# Patient Record
Sex: FEMALE | Race: BLACK OR AFRICAN AMERICAN | NOT HISPANIC OR LATINO | Employment: FULL TIME | URBAN - METROPOLITAN AREA
[De-identification: names, ages, dates, MRNs, and addresses within clinical notes are randomized per-mention and may not be internally consistent; named-entity substitution may affect disease eponyms.]

---

## 2020-11-02 LAB
EXTERNAL HEPATITIS B SURFACE ANTIGEN: NEGATIVE
EXTERNAL HEPATITIS C AB: NEGATIVE
EXTERNAL RUBELLA QUALITATIVE: NORMAL
EXTERNAL SYPHILIS RPR SCREEN: NORMAL
HIV1 P24 AG SERPL QL IA: NORMAL

## 2021-01-20 ENCOUNTER — TRANSCRIBE ORDERS (OUTPATIENT)
Dept: OBSTETRICS AND GYNECOLOGY | Facility: CLINIC | Age: 31
End: 2021-01-20

## 2021-01-20 DIAGNOSIS — D21.9 FIBROIDS: ICD-10-CM

## 2021-01-20 DIAGNOSIS — O99.212 MATERNAL OBESITY, ANTEPARTUM, SECOND TRIMESTER: Primary | ICD-10-CM

## 2021-02-03 ENCOUNTER — HOSPITAL ENCOUNTER (OUTPATIENT)
Dept: ULTRASOUND IMAGING | Facility: HOSPITAL | Age: 31
Discharge: HOME | End: 2021-02-03
Admitting: OBSTETRICS & GYNECOLOGY

## 2021-02-03 ENCOUNTER — TRANSCRIBE ORDERS (OUTPATIENT)
Dept: ULTRASOUND IMAGING | Facility: HOSPITAL | Age: 31
End: 2021-02-03

## 2021-02-03 DIAGNOSIS — O99.212 MATERNAL OBESITY, ANTEPARTUM, SECOND TRIMESTER: Primary | ICD-10-CM

## 2021-02-03 DIAGNOSIS — D21.9 FIBROIDS: ICD-10-CM

## 2021-02-03 DIAGNOSIS — O99.212 MATERNAL OBESITY, ANTEPARTUM, SECOND TRIMESTER: ICD-10-CM

## 2021-02-03 PROCEDURE — 76811 OB US DETAILED SNGL FETUS: CPT

## 2021-02-18 ENCOUNTER — TRANSCRIBE ORDERS (OUTPATIENT)
Dept: ULTRASOUND IMAGING | Facility: HOSPITAL | Age: 31
End: 2021-02-18

## 2021-02-18 ENCOUNTER — HOSPITAL ENCOUNTER (OUTPATIENT)
Dept: ULTRASOUND IMAGING | Facility: HOSPITAL | Age: 31
Discharge: HOME | End: 2021-02-18
Admitting: OBSTETRICS & GYNECOLOGY

## 2021-02-18 VITALS
SYSTOLIC BLOOD PRESSURE: 130 MMHG | HEIGHT: 65 IN | WEIGHT: 293 LBS | TEMPERATURE: 97.1 F | HEART RATE: 95 BPM | BODY MASS INDEX: 48.82 KG/M2 | DIASTOLIC BLOOD PRESSURE: 72 MMHG

## 2021-02-18 DIAGNOSIS — O99.212 MATERNAL OBESITY, ANTEPARTUM, SECOND TRIMESTER: Primary | ICD-10-CM

## 2021-02-18 DIAGNOSIS — O99.212 MATERNAL OBESITY, ANTEPARTUM, SECOND TRIMESTER: ICD-10-CM

## 2021-02-18 PROCEDURE — 76816 OB US FOLLOW-UP PER FETUS: CPT

## 2021-03-18 ENCOUNTER — HOSPITAL ENCOUNTER (OUTPATIENT)
Dept: ULTRASOUND IMAGING | Facility: HOSPITAL | Age: 31
Discharge: HOME | End: 2021-03-18
Admitting: OBSTETRICS & GYNECOLOGY

## 2021-03-18 ENCOUNTER — OFFICE VISIT (OUTPATIENT)
Dept: OBSTETRICS AND GYNECOLOGY | Facility: CLINIC | Age: 31
End: 2021-03-18

## 2021-03-18 ENCOUNTER — TRANSCRIBE ORDERS (OUTPATIENT)
Dept: DIABETES SERVICES | Facility: HOSPITAL | Age: 31
End: 2021-03-18

## 2021-03-18 ENCOUNTER — TRANSCRIBE ORDERS (OUTPATIENT)
Dept: ULTRASOUND IMAGING | Facility: HOSPITAL | Age: 31
End: 2021-03-18

## 2021-03-18 VITALS
BODY MASS INDEX: 48.82 KG/M2 | DIASTOLIC BLOOD PRESSURE: 56 MMHG | HEART RATE: 95 BPM | HEIGHT: 65 IN | WEIGHT: 293 LBS | SYSTOLIC BLOOD PRESSURE: 115 MMHG | TEMPERATURE: 96.9 F

## 2021-03-18 DIAGNOSIS — O24.410 DIET CONTROLLED GESTATIONAL DIABETES MELLITUS (GDM) IN THIRD TRIMESTER: ICD-10-CM

## 2021-03-18 DIAGNOSIS — Z36.89 ENCOUNTER FOR ULTRASOUND TO ASSESS FETAL GROWTH: Primary | ICD-10-CM

## 2021-03-18 DIAGNOSIS — O99.212 MATERNAL OBESITY, ANTEPARTUM, SECOND TRIMESTER: ICD-10-CM

## 2021-03-18 DIAGNOSIS — O24.410 DIET CONTROLLED GESTATIONAL DIABETES MELLITUS (GDM) IN SECOND TRIMESTER: Primary | ICD-10-CM

## 2021-03-18 DIAGNOSIS — O99.212 MATERNAL OBESITY, ANTEPARTUM, SECOND TRIMESTER: Primary | ICD-10-CM

## 2021-03-18 DIAGNOSIS — E66.01 MORBID OBESITY WITH BMI OF 50.0-59.9, ADULT (HCC): ICD-10-CM

## 2021-03-18 PROCEDURE — 99203 OFFICE O/P NEW LOW 30 MIN: CPT | Performed by: OBSTETRICS & GYNECOLOGY

## 2021-03-18 PROCEDURE — 76816 OB US FOLLOW-UP PER FETUS: CPT

## 2021-03-18 PROCEDURE — 76816 OB US FOLLOW-UP PER FETUS: CPT | Performed by: OBSTETRICS & GYNECOLOGY

## 2021-03-18 NOTE — PROGRESS NOTES
MATERNAL FETAL MEDICINE OUTPATIENT NOTE     Dear Dr BERONICA Snow     This is a followup visit.     Thank you for requesting my service to provide consultation for Radha Shipley is a 30 y.o.  at  28 3/7 weeks gestation (Estimated Date of Delivery: 21).   She is being seen for: fetal growth assessment and followup anatomy  Suboptimal anatomy given morbid obesity.     Patient states had GCT earlier this week and was > 200  Results not available at today's consultation  She says she is scheduled for diabetes teaching with her OB.     Prior notes from the patient's obstetrician were reviewed.     Today, she denies headache, blurry vision, RUQ pain. No vaginal bleeding, no contractions.     Chief complaint    ICD-10-CM ICD-9-CM   1. Encounter for ultrasound to assess fetal growth  Z36.89 V28.3   2. Morbid obesity with BMI of 50.0-59.9, adult (CMS/[facility])  E66.01 278.01    Z68.43 V85.43   3. New diagnosis per patient report GCT > 200 gestational diabetes mellitus (GDM) in third trimester  O24.410 648.83       Past obstetric, gynecological, medical, surgical, family and social history reviewed; no changes made.     Review of systems  Constitutional:  No Weight Change, No Fever, No Chills, No Fatigue, No Malaise  ENT/Mouth:  No Hearing Changes, No Sinus Pain, No Hoarseness, No sore throat, No   Eyes:  No Eye Pain, No Vision Changes  Cardiovascular:  No Chest Pain, No SOB, No Palpitations  Respiratory:  No Cough, No Wheezing, No Dyspnea  Gastrointestinal:  No Nausea, No Vomiting, No Diarrhea, No Constipation, No Pain   Genitourinary:   No Dysuria, No Urinary Frequency, No Hematuria, No Flank Pain  Musculoskeletal:  No Arthralgias, No Myalgias,   Skin:  No Skin Lesions, No Pruritis,   Neuro:  No Weakness, No Numbness, No Loss of Consciousness, No Syncope  Psych:  No Memory Changes, No Violence/Abuse Hx., No Eating Concerns  Heme/Lymph:  No Bruising, No Bleeding/  Endocrine:   No Temperature Intolerance    Vitals:     "21 0809   BP: 115/56   BP Location: Right arm   Patient Position: Sitting   Cuff Size: Large Adult   Pulse: 95   Temp: 96.9 °F (36.1 °C)   TempSrc: Infrared   Weight: (!) 151 kg (333 lb)   Height: 165.1 cm (65\")       PHYSICAL EXAM   GENERAL: Not in acute distress, gravid   NEURO: awake, alert and oriented to person, place, and time  ABDOMINAL: No fundal tenderness, no rebound or guarding   EXTREMITIES: Bilaterally lower extremities No edema, no tenderness  PELVIC: No obvious vaginal discharge, no bleeding    ULTRASOUND   Please view full ultrasound note on Imaging tab in ViewPoint.      ASSESSMENT   30 y.o.  at  28 3/7 weeks gestation (Estimated Date of Delivery: 21), reassuring fetal and maternal status.     1. Single live intrauterine pregnancy   [ X ] stable  [   ] improving [  ] worsening    2. Gestational Diabetes   New diagnosis   Per patient GCT > 200     Counseled patient that GCT > 200, she is likely pre-gestational diabetic but undiagnosed. States she did not have diabetes screening prior to pregnancy.     -Extensive diabetes counseling today -  morbidity associated with diabetes in pregnancy reviewed, including but not limited to: spontaneous , errors in organogenesis including increased risk of heart and skeletal defects, fetal macrosomia,  hypoglycemia, shoulder dystocia, increased risk of preeclampsia.   -Reviewed goals of fasting 60 - 95 and 2 hour postprandial< 120 throughout pregnancy.  -Counseled regarding risk of hypoglycemia (maternal and fetal risks)   -Recommend emailing/fax/submitting her log weekly for review to her primary OB office; to bring log to all MFM office visits.  -Education material given [email or web address]      3. Obesity in pregnancy  [ X ] stable  [   ] improving [  ] worsening    Obesity increases the risk of hypertensive disorders, diabetes and operative delivery. We discussed " recommended weight gain of 11-20 lb during pregnancy and discussed the importance of diet modifications and exercise. Technical limitations (eg, maternal obesity, prone fetal position, and late gestation) can make a detailed heart evaluation very difficult due to acoustic shadowing. Therefore, fetal anatomy is suboptimal and will likely remain suboptimal for the remainder of the pregnancy    Per ACOG: Women in pregnancy should aim for 30 minutes of moderate-intensity aerobic exercise at least 5 days a week or a minimum of 150 minutes per week      4. Fibroid in pregnancy   [ X ] stable  [   ] improving [  ] worsening  We discussed the fibroids may increase the risks for spontaneous , abruption, malpresentation, intrauterine growth restriction, and post-partum hemorrhage. There is a risk for fibroid degeneration and pain during pregnancy, which is generally treated supportively (e.g. with a supportive abdominal binder) or with a short course of pain medication.OB to continue to monitor the size and position of the fibroids with the growth ultrasounds.  [facility] information packet given      MANAGEMENT PLAN  -Scheduled for diabetes counseling and education with primary OB  -Once she starts monitoring her glucose values: Recommend contacting her primary OB office if >50% of any values are abnormal or any are over 200.   -Serial growth ultrasounds every 3 - 4 weeks   -Starting at 34 weeks: Weekly fetal  surveillance until delivery (start sooner if she becomes GDMA2)   -Starting at 28 weeks: Fetal movement instructions given continue daily until delivery; instructed to report to labor and delivery if cannot achieve more than 10 kicks in one hour or if she perceives a decrease in fetal movement  -If estimated fetal weight >4500gm at time of delivery, would recommend  delivery counseling       Plan discussed with Dr BERONICA Snow.   This note has been routed to the referring obstetricians/medical provider.    At the end of the consultation, all patient questions were answered, concerns addressed, and comprehensive management plan and followup given to patient.     Thank you for your clinical consult.     DANK GARCIA MD MPH FACOG  MATERNAL FETAL MEDICINE

## 2021-03-19 ENCOUNTER — APPOINTMENT (OUTPATIENT)
Dept: DIABETES SERVICES | Facility: HOSPITAL | Age: 31
End: 2021-03-19

## 2021-03-22 ENCOUNTER — HOSPITAL ENCOUNTER (OUTPATIENT)
Dept: DIABETES SERVICES | Facility: HOSPITAL | Age: 31
Discharge: HOME | End: 2021-03-22
Admitting: NURSE PRACTITIONER

## 2021-03-22 PROCEDURE — G0108 DIAB MANAGE TRN  PER INDIV: HCPCS

## 2021-04-15 ENCOUNTER — OFFICE VISIT (OUTPATIENT)
Dept: OBSTETRICS AND GYNECOLOGY | Facility: CLINIC | Age: 31
End: 2021-04-15

## 2021-04-15 ENCOUNTER — TRANSCRIBE ORDERS (OUTPATIENT)
Dept: ULTRASOUND IMAGING | Facility: HOSPITAL | Age: 31
End: 2021-04-15

## 2021-04-15 ENCOUNTER — HOSPITAL ENCOUNTER (OUTPATIENT)
Dept: ULTRASOUND IMAGING | Facility: HOSPITAL | Age: 31
Discharge: HOME | End: 2021-04-15
Admitting: OBSTETRICS & GYNECOLOGY

## 2021-04-15 VITALS
DIASTOLIC BLOOD PRESSURE: 62 MMHG | HEART RATE: 90 BPM | HEIGHT: 65 IN | BODY MASS INDEX: 48.82 KG/M2 | WEIGHT: 293 LBS | SYSTOLIC BLOOD PRESSURE: 119 MMHG | TEMPERATURE: 97.1 F

## 2021-04-15 DIAGNOSIS — O99.212 MATERNAL OBESITY, ANTEPARTUM, SECOND TRIMESTER: Primary | ICD-10-CM

## 2021-04-15 DIAGNOSIS — Z91.89 AT RISK FOR FETAL COMPROMISE: ICD-10-CM

## 2021-04-15 DIAGNOSIS — O99.212 MATERNAL OBESITY, ANTEPARTUM, SECOND TRIMESTER: ICD-10-CM

## 2021-04-15 DIAGNOSIS — E66.01 MORBID OBESITY WITH BMI OF 50.0-59.9, ADULT (HCC): ICD-10-CM

## 2021-04-15 DIAGNOSIS — O24.410 DIET CONTROLLED GESTATIONAL DIABETES MELLITUS (GDM) IN THIRD TRIMESTER: Primary | ICD-10-CM

## 2021-04-15 DIAGNOSIS — Z92.89 HISTORY OF FETAL BIOPHYSICAL PROFILE: ICD-10-CM

## 2021-04-15 DIAGNOSIS — Z91.89 AT RISK FOR HYPERTENSION: ICD-10-CM

## 2021-04-15 PROCEDURE — 76816 OB US FOLLOW-UP PER FETUS: CPT

## 2021-04-15 PROCEDURE — 99214 OFFICE O/P EST MOD 30 MIN: CPT | Performed by: OBSTETRICS & GYNECOLOGY

## 2021-04-15 PROCEDURE — 76816 OB US FOLLOW-UP PER FETUS: CPT | Performed by: OBSTETRICS & GYNECOLOGY

## 2021-04-15 PROCEDURE — 76819 FETAL BIOPHYS PROFIL W/O NST: CPT | Performed by: OBSTETRICS & GYNECOLOGY

## 2021-04-15 PROCEDURE — 76819 FETAL BIOPHYS PROFIL W/O NST: CPT

## 2021-04-15 RX ORDER — BLOOD-GLUCOSE METER
EACH MISCELLANEOUS
COMMUNITY
Start: 2021-03-19

## 2021-04-15 RX ORDER — BLOOD SUGAR DIAGNOSTIC
STRIP MISCELLANEOUS
COMMUNITY
Start: 2021-04-10

## 2021-04-15 RX ORDER — INSULIN DETEMIR 100 [IU]/ML
INJECTION, SOLUTION SUBCUTANEOUS
COMMUNITY
Start: 2021-03-30 | End: 2021-04-29 | Stop reason: SDUPTHER

## 2021-04-15 RX ORDER — PEN NEEDLE, DIABETIC 32 GX 1/4"
NEEDLE, DISPOSABLE MISCELLANEOUS
COMMUNITY
Start: 2021-03-30

## 2021-04-15 RX ORDER — LANCETS 30 GAUGE
EACH MISCELLANEOUS
COMMUNITY
Start: 2021-04-13

## 2021-04-15 NOTE — PROGRESS NOTES
MATERNAL FETAL MEDICINE OUTPATIENT NOTE     Dear Dr BERONICA nSow     This is a followup visit.     Thank you for requesting my service to provide consultation for Radha Shipley is a 30 y.o.   at  32 3/7 weeks gestation (Estimated Date of Delivery: 21).   She is being seen for:diabetes in pregnancy management, medication management, and fetal assessment.     Prior notes from the patient's obstetrician were reviewed.     Today, she denies headache, blurry vision, RUQ pain. No vaginal bleeding, no contractions.     Chief complaint    ICD-10-CM ICD-9-CM   1. New diagnosis per patient report GCT > 200 gestational diabetes mellitus (GDM) in third trimester  O24.410 648.83   2. Morbid obesity with BMI of 50.0-59.9, adult (CMS/[facility])  E66.01 278.01    Z68.43 V85.43   3. At risk for hypertension  Z91.89 V15.89   4. History of fetal biophysical profile  Z92.89 V15.89   5. At risk for fetal compromise  Z91.89 V15.89       Past obstetric, gynecological, medical, surgical, family and social history reviewed; no changes made.     Review of systems  Constitutional:  No Weight Change, No Fever, No Chills, No Fatigue, No Malaise  ENT/Mouth:  No Hearing Changes, No Sinus Pain, No Hoarseness, No sore throat, No   Eyes:  No Eye Pain, No Vision Changes  Cardiovascular:  No Chest Pain, No SOB, No Palpitations  Respiratory:  No Cough, No Wheezing, No Dyspnea  Gastrointestinal:  No Nausea, No Vomiting, No Diarrhea, No Constipation, No Pain   Genitourinary:   No Dysuria, No Urinary Frequency, No Hematuria, No Flank Pain  Musculoskeletal:  No Arthralgias, No Myalgias,   Skin:  No Skin Lesions, No Pruritis,   Neuro:  No Weakness, No Numbness, No Loss of Consciousness, No Syncope  Psych:  No Memory Changes, No Violence/Abuse Hx., No Eating Concerns  Heme/Lymph:  No Bruising, No Bleeding  Endocrine:   No Temperature Intolerance    Vitals:    04/15/21 0835   BP: 119/62   BP Location: Right arm   Patient Position: Sitting   Cuff Size: Large  "Adult   Pulse: 90   Temp: 97.1 °F (36.2 °C)   TempSrc: Infrared   Weight: (!) 152 kg (334 lb)   Height: 165.1 cm (65\")       PHYSICAL EXAM   GENERAL: Not in acute distress, gravid   NEURO: awake, alert and oriented to person, place, and time  ABDOMINAL: No fundal tenderness, no rebound or guarding   EXTREMITIES: Bilaterally lower extremities No edema, no tenderness  PELVIC: No obvious vaginal discharge, no bleeding    ULTRASOUND   Please view full ultrasound note on Imaging tab in ViewPoint.      ASSESSMENT   30 y.o.   at  32 3/7 weeks gestation (Estimated Date of Delivery: 21), reassuring fetal and maternal status.     1. Single live intrauterine pregnancy   [ X ] stable  [   ] improving [  ] worsening    2. Gestational Diabetes - likely Pregestational diabetes   No log today - unclear control     -Extensive diabetes counseling today -  morbidity associated with diabetes in pregnancy reviewed, including but not limited to: spontaneous , errors in organogenesis including increased risk of heart and skeletal defects, fetal macrosomia,  hypoglycemia, shoulder dystocia, increased risk of preeclampsia.   -Reviewed goals of fasting 60 - 95 and 2 hour postprandial< 120 throughout pregnancy.  -Counseled regarding risk of hypoglycemia (maternal and fetal risks)   -Recommend emailing/fax/submitting her log weekly for review to her primary OB office; to bring log to all MFM office visits.     3. Obesity in pregnancy - see prior consultation   [ X ] stable  [   ] improving [  ] worsening     4. Fibroid in pregnancy - see prior consultation   [ X ] stable  [   ] improving [  ] worsening       MANAGEMENT PLAN  -Reports will email glucose log to MFM office for formal review.   -Once she starts monitoring her glucose values: Recommend contacting her primary OB office if >50% of any values are abnormal or any are over 200.   -Serial growth ultrasounds every 3 - 4 weeks   -Weekly fetal  " surveillance until delivery; starting now given unclear control   -Starting at 28 weeks: Fetal movement instructions given continue daily until delivery; instructed to report to labor and delivery if cannot achieve more than 10 kicks in one hour or if she perceives a decrease in fetal movement  -If estimated fetal weight >4500gm at time of delivery, would recommend  delivery counseling            This note has been routed to the referring obstetricians/medical provider.   At the end of the consultation, all patient questions were answered, concerns addressed, and comprehensive management plan and followup given to patient.     Thank you for your clinical consult.     DANK GARCIA MD MPH FACOG  MATERNAL FETAL MEDICINE

## 2021-04-16 ENCOUNTER — BULK ORDERING (OUTPATIENT)
Dept: CASE MANAGEMENT | Facility: OTHER | Age: 31
End: 2021-04-16

## 2021-04-16 ENCOUNTER — TELEPHONE (OUTPATIENT)
Dept: OBSTETRICS AND GYNECOLOGY | Facility: CLINIC | Age: 31
End: 2021-04-16

## 2021-04-16 DIAGNOSIS — Z23 IMMUNIZATION DUE: ICD-10-CM

## 2021-04-16 NOTE — TELEPHONE ENCOUNTER
Called patient and reviewed glucose log values. On Levemir 40units nightly increased from 30 units by diabetes nurse.  No meal time insulin needed - for now     Glucose log  Fasting > 50% ABnormal   Postprandial values   Breakfast  > 50% normal  Lunch > 50% normal  Dinner > 50% normal    Dietary indiscretions reviewed.   Proper eating habits reviewed.     Followup with MFM in one week.     DANK GARCIA MD MPH FACOG  MATERNAL FETAL MEDICINE

## 2021-04-22 ENCOUNTER — OFFICE VISIT (OUTPATIENT)
Dept: OBSTETRICS AND GYNECOLOGY | Facility: CLINIC | Age: 31
End: 2021-04-22

## 2021-04-22 ENCOUNTER — TRANSCRIBE ORDERS (OUTPATIENT)
Dept: ULTRASOUND IMAGING | Facility: HOSPITAL | Age: 31
End: 2021-04-22

## 2021-04-22 ENCOUNTER — HOSPITAL ENCOUNTER (OUTPATIENT)
Dept: ULTRASOUND IMAGING | Facility: HOSPITAL | Age: 31
Discharge: HOME | End: 2021-04-22
Admitting: OBSTETRICS & GYNECOLOGY

## 2021-04-22 VITALS
TEMPERATURE: 96.8 F | HEIGHT: 65 IN | SYSTOLIC BLOOD PRESSURE: 121 MMHG | WEIGHT: 293 LBS | DIASTOLIC BLOOD PRESSURE: 66 MMHG | BODY MASS INDEX: 48.82 KG/M2 | HEART RATE: 98 BPM

## 2021-04-22 DIAGNOSIS — Z92.89 HISTORY OF FETAL BIOPHYSICAL PROFILE: ICD-10-CM

## 2021-04-22 DIAGNOSIS — O99.212 MATERNAL OBESITY, ANTEPARTUM, SECOND TRIMESTER: Primary | ICD-10-CM

## 2021-04-22 DIAGNOSIS — O99.212 MATERNAL OBESITY, ANTEPARTUM, SECOND TRIMESTER: ICD-10-CM

## 2021-04-22 DIAGNOSIS — O24.913 DIABETES MELLITUS DURING PREGNANCY IN THIRD TRIMESTER, UNSPECIFIED DIABETES MELLITUS TYPE: ICD-10-CM

## 2021-04-22 DIAGNOSIS — E66.01 MORBID OBESITY WITH BMI OF 50.0-59.9, ADULT (HCC): ICD-10-CM

## 2021-04-22 DIAGNOSIS — Z91.89 AT RISK FOR FETAL COMPROMISE: ICD-10-CM

## 2021-04-22 DIAGNOSIS — O24.410 DIET CONTROLLED GESTATIONAL DIABETES MELLITUS (GDM) IN THIRD TRIMESTER: Primary | ICD-10-CM

## 2021-04-22 PROCEDURE — 76819 FETAL BIOPHYS PROFIL W/O NST: CPT | Performed by: OBSTETRICS & GYNECOLOGY

## 2021-04-22 PROCEDURE — 76819 FETAL BIOPHYS PROFIL W/O NST: CPT

## 2021-04-22 PROCEDURE — 99214 OFFICE O/P EST MOD 30 MIN: CPT | Performed by: OBSTETRICS & GYNECOLOGY

## 2021-04-22 NOTE — PROGRESS NOTES
MATERNAL FETAL MEDICINE OUTPATIENT NOTE     Dear Dr BERONICA Snow    This is a  followup visit.     Thank you for requesting my service to provide consultation for Radha Shipley is a 30 y.o.   at  33 3/7 weeks gestation (Estimated Date of Delivery: 21).   She is being seen for:diabetes in pregnancy management, medication management, and fetal assessment.   Maintains daily fetal movement counts reports > 10 movements per hour.     Prior notes from the patient's obstetrician were reviewed.     Today, she denies headache, blurry vision, RUQ pain. No vaginal bleeding, no contractions.     Chief complaint    ICD-10-CM ICD-9-CM   1. New diagnosis per patient report GCT > 200 gestational diabetes mellitus (GDM) in third trimester  O24.410 648.83   2. Morbid obesity with BMI of 50.0-59.9, adult (CMS/[facility])  E66.01 278.01    Z68.43 V85.43   3. History of fetal biophysical profile  Z92.89 V15.89   4. At risk for fetal compromise  Z91.89 V15.89       Past obstetric, gynecological, medical, surgical, family and social history reviewed; no changes made.     Review of systems  Constitutional:  No Weight Change, No Fever, No Chills, No Fatigue, No Malaise  ENT/Mouth:  No Hearing Changes, No Sinus Pain, No Hoarseness, No sore throat, No   Eyes:  No Eye Pain, No Vision Changes  Cardiovascular:  No Chest Pain, No SOB, No Palpitations  Respiratory:  No Cough, No Wheezing, No Dyspnea  Gastrointestinal:  No Nausea, No Vomiting, No Diarrhea, No Constipation, No Pain   Genitourinary:   No Dysuria, No Urinary Frequency, No Hematuria, No Flank Pain  Musculoskeletal:  No Arthralgias, No Myalgias,   Skin:  No Skin Lesions, No Pruritis,   Neuro:  No Weakness, No Numbness, No Loss of Consciousness, No Syncope  Psych:  No Memory Changes, No Violence/Abuse Hx., No Eating Concerns  Heme/Lymph:  No Bruising, No Bleeding  Endocrine:   No Temperature Intolerance    Vitals:    21 0909   BP: 121/66   BP Location: Right arm   Patient Position:  "Sitting   Cuff Size: Large Adult   Pulse: 98   Temp: 96.8 °F (36 °C)   TempSrc: Infrared   Weight: (!) 153 kg (337 lb)   Height: 165.1 cm (65\")       PHYSICAL EXAM   GENERAL: Not in acute distress, gravid   NEURO: awake, alert and oriented to person, place, and time  ABDOMINAL: No fundal tenderness, no rebound or guarding   EXTREMITIES: Bilaterally lower extremities No edema, no tenderness  PELVIC: No obvious vaginal discharge, no bleeding    ULTRASOUND   Please view full ultrasound note on Imaging tab in ViewPoint.      ASSESSMENT   30 y.o.   at  33 3/7 weeks gestation (Estimated Date of Delivery: 21), reassuring fetal and maternal status.     1. Single live intrauterine pregnancy   [ X ] stable  [   ] improving [  ] worsening    2. Gestational Diabetes - likely Pregestational diabetes   [ X ] stable  [   ] improving [  ] worsening  [  ]  well-controlled  [  ] good-control  [ X ] fair-control [  ] poorly-controlled  [  ] non-compliant    INSULIN MEDICATION   NPH 40units  increased to 45 units - then had a 131 fasting     Glucose log  Fasting > 50% ABNORMAL, one normal value = 95   Postprandial values   Breakfast  > 50% normal  Lunch > 50% normal  Dinner > 50% normal     - Counseled regarding - nighttime hypoglycemia with rebound fasting hyperglycemia\" that can occur when increasing PM dosing of insulin    -Reviewed goals of fasting 60 - 95 and 2 hour postprandial< 120 throughout pregnancy.  -Counseled regarding risk of hypoglycemia (maternal and fetal risks)   -Recommend emailing/fax/submitting her log weekly for review to her primary OB office; to bring log to all MFM office visits.     3. Obesity in pregnancy - see prior consultation   [ X ] stable  [   ] improving [  ] worsening     4. Fibroid in pregnancy - see prior consultation   [ X ] stable  [   ] improving [  ] worsening        MANAGEMENT PLAN  -Continue nighttime NPH at 45 units   -Recommend patient to check 2AM glucose values for 2 - 3 nights to " "help assess for \"nighttime hypoglycemia with rebound fasting hyperglycemia\" that can occur when increasing PM dosing of insulin  - If fasting values persistently > 95 then recommend reducing by 2 - 4 units     -Recommend contacting her primary OB office if >50% of any values are abnormal or any are over 200.   -Serial growth ultrasounds every 3 - 4 weeks   -Weekly fetal  surveillance until delivery; starting now given unclear control   -Movement log given -  Fetal movement instructions given continue daily until delivery; instructed to report to labor and delivery if cannot achieve more than 10 kicks in one hour or if she perceives a decrease in fetal movement  -If estimated fetal weight >4500gm at time of delivery, would recommend  delivery counseling                 This note has been routed to the referring obstetricians/medical provider.   At the end of the consultation, all patient questions were answered, concerns addressed, and comprehensive management plan and followup given to patient.     Thank you for your clinical consult.     DANK GARCIA MD MPH FACOG  MATERNAL FETAL MEDICINE       "

## 2021-04-29 ENCOUNTER — HOSPITAL ENCOUNTER (OUTPATIENT)
Dept: ULTRASOUND IMAGING | Facility: HOSPITAL | Age: 31
Discharge: HOME | End: 2021-04-29
Admitting: OBSTETRICS & GYNECOLOGY

## 2021-04-29 ENCOUNTER — OFFICE VISIT (OUTPATIENT)
Dept: OBSTETRICS AND GYNECOLOGY | Facility: CLINIC | Age: 31
End: 2021-04-29

## 2021-04-29 VITALS
WEIGHT: 293 LBS | HEIGHT: 65 IN | TEMPERATURE: 97.3 F | BODY MASS INDEX: 48.82 KG/M2 | HEART RATE: 97 BPM | DIASTOLIC BLOOD PRESSURE: 79 MMHG | SYSTOLIC BLOOD PRESSURE: 129 MMHG

## 2021-04-29 DIAGNOSIS — O99.212 MATERNAL OBESITY, ANTEPARTUM, SECOND TRIMESTER: ICD-10-CM

## 2021-04-29 DIAGNOSIS — E66.01 MORBID OBESITY WITH BMI OF 50.0-59.9, ADULT (HCC): ICD-10-CM

## 2021-04-29 DIAGNOSIS — O34.10 UTERINE FIBROID IN PREGNANCY: ICD-10-CM

## 2021-04-29 DIAGNOSIS — D25.9 UTERINE FIBROID IN PREGNANCY: ICD-10-CM

## 2021-04-29 DIAGNOSIS — O24.913 DIABETES MELLITUS AFFECTING PREGNANCY IN THIRD TRIMESTER: Primary | ICD-10-CM

## 2021-04-29 DIAGNOSIS — Z91.89 AT RISK FOR FETAL COMPROMISE: ICD-10-CM

## 2021-04-29 DIAGNOSIS — Z92.89 HISTORY OF FETAL BIOPHYSICAL PROFILE: ICD-10-CM

## 2021-04-29 DIAGNOSIS — R73.09 POOR GLYCEMIC CONTROL: ICD-10-CM

## 2021-04-29 PROCEDURE — 76819 FETAL BIOPHYS PROFIL W/O NST: CPT | Performed by: OBSTETRICS & GYNECOLOGY

## 2021-04-29 PROCEDURE — 99214 OFFICE O/P EST MOD 30 MIN: CPT | Performed by: OBSTETRICS & GYNECOLOGY

## 2021-04-29 PROCEDURE — 76819 FETAL BIOPHYS PROFIL W/O NST: CPT

## 2021-04-29 RX ORDER — INSULIN DETEMIR 100 [IU]/ML
50 INJECTION, SOLUTION SUBCUTANEOUS NIGHTLY
Qty: 5 PEN | Refills: 0 | Status: SHIPPED | OUTPATIENT
Start: 2021-04-29 | End: 2021-06-01 | Stop reason: HOSPADM

## 2021-04-29 NOTE — PROGRESS NOTES
MATERNAL FETAL MEDICINE OUTPATIENT NOTE     Dear Dr BERONICA Snow     This is a followup visit.     Thank you for requesting my service to provide consultation for Radha Shipley is a 30 y.o.   at  34 3/7 weeks gestation (Estimated Date of Delivery: 21).   She is being seen for:diabetes in pregnancy management, medication management, and fetal assessment.   Maintains daily fetal movement counts reports > 10 movements per hour.     Prior notes from the patient's obstetrician were reviewed.     Today, she denies headache, blurry vision, RUQ pain. No vaginal bleeding, no contractions.     Chief complaint    ICD-10-CM ICD-9-CM   1. Diabetes mellitus affecting pregnancy in third trimester  O24.913 648.03     250.00   2. Poor glycemic control  R73.09 790.29   3. History of fetal biophysical profile  Z92.89 V15.89   4. At risk for fetal compromise  Z91.89 V15.89   5. Morbid obesity with BMI of 50.0-59.9, adult (CMS/[facility])  E66.01 278.01    Z68.43 V85.43   6. Uterine fibroid in pregnancy  O34.10 654.10    D25.9 218.9       Past obstetric, gynecological, medical, surgical, family and social history reviewed; no changes made.     Review of systems  Constitutional:  No Weight Change, No Fever, No Chills, No Fatigue, No Malaise  ENT/Mouth:  No Hearing Changes, No Sinus Pain, No Hoarseness, No sore throat, No   Eyes:  No Eye Pain, No Vision Changes  Cardiovascular:  No Chest Pain, No SOB, No Palpitations  Respiratory:  No Cough, No Wheezing, No Dyspnea  Gastrointestinal:  No Nausea, No Vomiting, No Diarrhea, No Constipation, No Pain   Genitourinary:   No Dysuria, No Urinary Frequency, No Hematuria, No Flank Pain  Musculoskeletal:  No Arthralgias, No Myalgias,   Skin:  No Skin Lesions, No Pruritis,   Neuro:  No Weakness, No Numbness, No Loss of Consciousness, No Syncope  Psych:  No Memory Changes, No Violence/Abuse Hx., No Eating Concerns  Heme/Lymph:  No Bruising, No Bleeding  Endocrine:   No Temperature  "Intolerance    Vitals:    21 0947   BP: 129/79   BP Location: Right arm   Patient Position: Sitting   Cuff Size: Large Adult   Pulse: 97   Temp: 97.3 °F (36.3 °C)   TempSrc: Infrared   Weight: (!) 151 kg (333 lb)   Height: 165.1 cm (65\")       PHYSICAL EXAM   GENERAL: Not in acute distress, gravid   NEURO: awake, alert and oriented to person, place, and time  ABDOMINAL: No fundal tenderness, no rebound or guarding   EXTREMITIES: Bilaterally lower extremities No edema, no tenderness  PELVIC: No obvious vaginal discharge, no bleeding    ULTRASOUND   Please view full ultrasound note on Imaging tab in ViewPoint.      ASSESSMENT   30 y.o.   at  34 3/7 weeks gestation (Estimated Date of Delivery: 21), reassuring fetal and maternal status.     1. Single live intrauterine pregnancy   [ X ] stable  [   ] improving [  ] worsening    2. Gestational Diabetes - likely Pregestational diabetes   [   ] stable  [   ] improving [ X ] worsening  [  ]  well-controlled  [  ] good-control  [ X ] fair-control [  ] poorly-controlled  [  ] non-compliant     INSULIN MEDICATION   NPH 45 units in PM      Glucose log  Fasting > 50% ABNORMAL, 2/3am glucose values normal - no evidence of hypoglycemia   Postprandial values   Breakfast  > 50% normal  Lunch > 50% normal  Dinner > 50% normal     - Counseled regarding - nighttime hypoglycemia with rebound fasting hyperglycemia\" is likely NOT occurring, instead persistent insulin resistance of placenta is likely at play      -Reviewed goals of fasting 60 - 95 and 2 hour postprandial< 120 throughout pregnancy.  -Counseled regarding risk of hypoglycemia (maternal and fetal risks)   -Recommend emailing/fax/submitting her log weekly for review to her primary OB office; to bring log to all MFM office visits.     3. Obesity in pregnancy - see prior consultation   [ X ] stable  [   ] improving [  ] worsening     4. Fibroid in pregnancy - see prior consultation   [ X ] stable  [   ] improving [ " " ] worsening         REVISED INSULIN MEDICATION  (changes in bold)   NPH 50 units in PM        MANAGEMENT PLAN  Patient is extremely compliant. However placental influence on insulin resistance persists even at this late stage in the 3rd trimester approaching term.     -Recommend contacting her primary OB office if >50% of any values are abnormal or any are over 200.   -Serial growth ultrasounds - next scheduled at approx 35 weeks gestation   -Weekly fetal  surveillance until delivery  -Movement log reviewed -  Fetal movement instructions given continue daily until delivery; instructed to report to labor and delivery if cannot achieve more than 10 kicks in one hour or if she perceives a decrease in fetal movement  -If estimated fetal weight >4500gm at time of delivery, would recommend  delivery counseling     DELIVERY MANAGEMENT   Per ACOG, pregestational diabetes poor glucose control - 36 0/7 to 38 6/7 weeks gestation      - Counseled patient that poorly controlled maternal diabetes in pregnancy may delay fetal pulmonary maturation. However the risk of IUFD should be weighed with potential delay in fetal pulmonary maturity (and need for NICU) when making decision regarding delivery timing. Considering  the large amounts of insulin required, increases in dosages, and increased risk of IUFD, would recommend delivery 37 - 38 weeks; sooner if fetal assessment is unfavorable or for any other clinical indication. Patient is aware that NICU care may be needed with the delivery of her term infant.       betamethasone between 34 - 36 6/7 weeks gestation  A 2016, multicenter, randomized trial demonstrated that \"administration of  betamethasone in women at risk for late  delivery significantly decreased the rate of respiratory complications in newborns. Betamethasone administration significantly increased the rate of  hypoglycemia but not the rates of other maternal or " " complications.\" It should be noted that pre-gestational diabetics were excluded from this study. (n engl j med 374;14.2016) - would recommend against giving steroids for fetal lung maturity acceleration in preparation for term induction           This note has been routed to the referring obstetricians/medical provider.   At the end of the consultation, all patient questions were answered, concerns addressed, and comprehensive management plan and followup given to patient.     Thank you for your clinical consult.     DANK GARCIA MD MPH FACOG  MATERNAL FETAL MEDICINE       "

## 2021-05-06 ENCOUNTER — HOSPITAL ENCOUNTER (OUTPATIENT)
Dept: ULTRASOUND IMAGING | Facility: HOSPITAL | Age: 31
Discharge: HOME | End: 2021-05-06
Admitting: OBSTETRICS & GYNECOLOGY

## 2021-05-06 ENCOUNTER — OFFICE VISIT (OUTPATIENT)
Dept: OBSTETRICS AND GYNECOLOGY | Facility: CLINIC | Age: 31
End: 2021-05-06

## 2021-05-06 VITALS
HEART RATE: 94 BPM | WEIGHT: 293 LBS | DIASTOLIC BLOOD PRESSURE: 66 MMHG | HEIGHT: 65 IN | BODY MASS INDEX: 48.82 KG/M2 | SYSTOLIC BLOOD PRESSURE: 117 MMHG | TEMPERATURE: 96.8 F

## 2021-05-06 DIAGNOSIS — O99.212 MATERNAL OBESITY, ANTEPARTUM, SECOND TRIMESTER: ICD-10-CM

## 2021-05-06 DIAGNOSIS — D25.9 UTERINE FIBROID IN PREGNANCY: ICD-10-CM

## 2021-05-06 DIAGNOSIS — Z92.89 HISTORY OF FETAL BIOPHYSICAL PROFILE: ICD-10-CM

## 2021-05-06 DIAGNOSIS — O34.10 UTERINE FIBROID IN PREGNANCY: ICD-10-CM

## 2021-05-06 DIAGNOSIS — Z91.89 AT RISK FOR FETAL COMPROMISE: ICD-10-CM

## 2021-05-06 DIAGNOSIS — O24.913 DIABETES MELLITUS AFFECTING PREGNANCY IN THIRD TRIMESTER: Primary | ICD-10-CM

## 2021-05-06 DIAGNOSIS — E66.01 MORBID OBESITY WITH BMI OF 50.0-59.9, ADULT (HCC): ICD-10-CM

## 2021-05-06 DIAGNOSIS — Z91.89 AT RISK FOR HYPERTENSION: ICD-10-CM

## 2021-05-06 PROCEDURE — 99214 OFFICE O/P EST MOD 30 MIN: CPT | Performed by: OBSTETRICS & GYNECOLOGY

## 2021-05-06 PROCEDURE — 76816 OB US FOLLOW-UP PER FETUS: CPT

## 2021-05-06 PROCEDURE — 76819 FETAL BIOPHYS PROFIL W/O NST: CPT

## 2021-05-06 PROCEDURE — 76819 FETAL BIOPHYS PROFIL W/O NST: CPT | Performed by: OBSTETRICS & GYNECOLOGY

## 2021-05-06 PROCEDURE — 76816 OB US FOLLOW-UP PER FETUS: CPT | Performed by: OBSTETRICS & GYNECOLOGY

## 2021-05-06 NOTE — PROGRESS NOTES
MATERNAL FETAL MEDICINE OUTPATIENT NOTE     Dear Dr BERONICA Snow    This is a  followup visit.     Thank you for requesting my service to provide consultation for Radha Shipley is a 30 y.o.   at  35 3/7 weeks gestation (Estimated Date of Delivery: 21).   She is being seen for:diabetes in pregnancy management, medication management, and fetal assessment.   Maintains daily fetal movement counts reports > 10 movements per hour.     Prior notes from the patient's obstetrician were reviewed.     Today, she denies headache, blurry vision, RUQ pain. No vaginal bleeding, no contractions.     Chief complaint    ICD-10-CM ICD-9-CM   1. Diabetes mellitus affecting pregnancy in third trimester  O24.913 648.03     250.00   2. History of fetal biophysical profile  Z92.89 V15.89   3. At risk for fetal compromise  Z91.89 V15.89   4. Morbid obesity with BMI of 50.0-59.9, adult (CMS/[facility])  E66.01 278.01    Z68.43 V85.43   5. Uterine fibroid in pregnancy  O34.10 654.10    D25.9 218.9   6. At risk for hypertension  Z91.89 V15.89       Past obstetric, gynecological, medical, surgical, family and social history reviewed; no changes made.     Review of systems  Constitutional:  No Weight Change, No Fever, No Chills, No Fatigue, No Malaise  ENT/Mouth:  No Hearing Changes, No Sinus Pain, No Hoarseness, No sore throat, No   Eyes:  No Eye Pain, No Vision Changes  Cardiovascular:  No Chest Pain, No SOB, No Palpitations  Respiratory:  No Cough, No Wheezing, No Dyspnea  Gastrointestinal:  No Nausea, No Vomiting, No Diarrhea, No Constipation, No Pain   Genitourinary:   No Dysuria, No Urinary Frequency, No Hematuria, No Flank Pain  Musculoskeletal:  No Arthralgias, No Myalgias,   Skin:  No Skin Lesions, No Pruritis,   Neuro:  No Weakness, No Numbness, No Loss of Consciousness, No Syncope  Psych:  No Memory Changes, No Violence/Abuse Hx., No Eating Concerns  Heme/Lymph:  No Bruising, No Bleeding  Endocrine:   No Temperature  "Intolerance    Vitals:    21 0912   BP: 117/66   BP Location: Right arm   Patient Position: Sitting   Cuff Size: Large Adult   Pulse: 94   Temp: 96.8 °F (36 °C)   TempSrc: Infrared   Weight: (!) 151 kg (332 lb)   Height: 165.1 cm (65\")       PHYSICAL EXAM   GENERAL: Not in acute distress, gravid   NEURO: awake, alert and oriented to person, place, and time  ABDOMINAL: No fundal tenderness, no rebound or guarding   EXTREMITIES: Bilaterally lower extremities No edema, no tenderness  PELVIC: No obvious vaginal discharge, no bleeding    ULTRASOUND   Please view full ultrasound note on Imaging tab in [employer].      ASSESSMENT   30 y.o.   at  35 3/7 weeks gestation (Estimated Date of Delivery: 21), reassuring fetal and maternal status.     1. Single live intrauterine pregnancy   [ X ] stable  [   ] improving [  ] worsening    2. Gestational Diabetes - likely Pregestational diabetes (GCT > 210)   [   ] stable  [  X ] improving [  ] worsening  [  ]  well-controlled  [  ] good-control  [ X ] fair-control [  ] poorly-controlled  [  ] non-compliant     INSULIN MEDICATION   NPH 50 units in PM      Glucose log  Fasting > 50% normal  Postprandial values   Breakfast  > 50% normal  Lunch > 50% normal  Dinner > 50% normal    -Reviewed goals of fasting 60 - 95 and 2 hour postprandial< 120 throughout pregnancy.  -Counseled regarding risk of hypoglycemia (maternal and fetal risks)   -Recommend emailing/fax/submitting her log weekly for review to her primary OB office; to bring log to all MFM office visits.     3. Obesity in pregnancy - see prior consultation   [ X ] stable  [   ] improving [  ] worsening     4. Fibroid in pregnancy - see prior consultation   [ X ] stable  [   ] improving [  ] worsening           No changes in INSULIN MEDICATION         MANAGEMENT PLAN  -No need to start insulin with meals as Postprandial values > 50% normal  -Continue Nighttime regimen as listed above   -Recommend contacting her primary " "OB office if >50% of any values are abnormal or any are over 200.   -Weekly fetal  surveillance until delivery - with MFM given increased risk of poorly controlled glucose values and maternal / fetal morbidity   -LOG given -  Fetal movement instructions given continue daily until delivery; instructed to report to labor and delivery if cannot achieve more than 10 kicks in one hour or if she perceives a decrease in fetal movement  -If estimated fetal weight >4500gm at time of delivery, would recommend  delivery counseling - currently EFW is at 2387g (21)      DELIVERY MANAGEMENT   Per ACOG, pregestational diabetes individualized based on her current and past control - 38 0/7 -  38 6/7 weeks gestation     Patient is aware that induction timing may change based on glucose control and fetal assessment in the next 2 weeks prior to induction date.      Counseled patient that poorly controlled maternal diabetes in pregnancy may delay fetal pulmonary maturation. However the risk of IUFD should be weighed with potential delay in fetal pulmonary maturity (and need for NICU) when making decision regarding delivery timing. Considering  the large amounts of insulin required, increases in dosages, and increased risk of IUFD, may recommend delivery 37 - 38 weeks; sooner if fetal assessment is unfavorable or for any other clinical indication. Patient is aware that NICU care may be needed with the delivery of her term infant.       betamethasone between 34 - 36 6/7 weeks gestation  A 2016, multicenter, randomized trial demonstrated that \"administration of  betamethasone in women at risk for late  delivery significantly decreased the rate of respiratory complications in newborns. Betamethasone administration significantly increased the rate of  hypoglycemia but not the rates of other maternal or  complications.\" It should be noted that pre-gestational diabetics were " excluded from this study. (n engl j med 374;14.April 7, 2016) - would recommend against giving steroids for fetal lung maturity acceleration in preparation for term induction              This note has been routed to the referring obstetricians/medical provider.   At the end of the consultation, all patient questions were answered, concerns addressed, and comprehensive management plan and followup given to patient.     Thank you for your clinical consult.     DANK GARCIA MD MPH FACOG  MATERNAL FETAL MEDICINE

## 2021-05-11 LAB — EXTERNAL GROUP B STREP ANTIGEN: NEGATIVE

## 2021-05-13 ENCOUNTER — HOSPITAL ENCOUNTER (OUTPATIENT)
Dept: ULTRASOUND IMAGING | Facility: HOSPITAL | Age: 31
Discharge: HOME | End: 2021-05-13
Admitting: OBSTETRICS & GYNECOLOGY

## 2021-05-13 ENCOUNTER — OFFICE VISIT (OUTPATIENT)
Dept: OBSTETRICS AND GYNECOLOGY | Facility: CLINIC | Age: 31
End: 2021-05-13

## 2021-05-13 VITALS
BODY MASS INDEX: 48.82 KG/M2 | OXYGEN SATURATION: 98 % | HEIGHT: 65 IN | DIASTOLIC BLOOD PRESSURE: 62 MMHG | WEIGHT: 293 LBS | HEART RATE: 104 BPM | SYSTOLIC BLOOD PRESSURE: 116 MMHG | TEMPERATURE: 96.9 F

## 2021-05-13 DIAGNOSIS — O24.913 DIABETES MELLITUS DURING PREGNANCY IN THIRD TRIMESTER, UNSPECIFIED DIABETES MELLITUS TYPE: ICD-10-CM

## 2021-05-13 DIAGNOSIS — O24.913 DIABETES MELLITUS AFFECTING PREGNANCY IN THIRD TRIMESTER: Primary | ICD-10-CM

## 2021-05-13 DIAGNOSIS — Z91.89 AT RISK FOR FETAL COMPROMISE: ICD-10-CM

## 2021-05-13 DIAGNOSIS — O34.10 UTERINE FIBROID IN PREGNANCY: ICD-10-CM

## 2021-05-13 DIAGNOSIS — D25.9 UTERINE FIBROID IN PREGNANCY: ICD-10-CM

## 2021-05-13 DIAGNOSIS — O99.212 MATERNAL OBESITY, ANTEPARTUM, SECOND TRIMESTER: ICD-10-CM

## 2021-05-13 DIAGNOSIS — E66.01 MORBID OBESITY WITH BMI OF 50.0-59.9, ADULT (HCC): ICD-10-CM

## 2021-05-13 DIAGNOSIS — Z92.89 HISTORY OF FETAL BIOPHYSICAL PROFILE: ICD-10-CM

## 2021-05-13 DIAGNOSIS — Z91.89 AT RISK FOR HYPERTENSION: ICD-10-CM

## 2021-05-13 PROCEDURE — 99214 OFFICE O/P EST MOD 30 MIN: CPT | Performed by: OBSTETRICS & GYNECOLOGY

## 2021-05-13 PROCEDURE — 76819 FETAL BIOPHYS PROFIL W/O NST: CPT

## 2021-05-13 PROCEDURE — 76819 FETAL BIOPHYS PROFIL W/O NST: CPT | Performed by: OBSTETRICS & GYNECOLOGY

## 2021-05-13 NOTE — PROGRESS NOTES
MATERNAL FETAL MEDICINE OUTPATIENT NOTE     Dear Dr BERONICA Snow     This is a followup visit.     Thank you for requesting my service to provide consultation for Radha Shipley is a 30 y.o.  at 36 3/7 weeks gestation (Estimated Date of Delivery: 21).   She is being seen for:diabetes in pregnancy management, medication management, and fetal assessment.   Maintains daily fetal movement counts reports > 10 movements per hour.     Prior notes from the patient's obstetrician were reviewed.     Today, she denies headache, blurry vision, RUQ pain. No vaginal bleeding, no contractions.     Chief complaint    ICD-10-CM ICD-9-CM   1. Diabetes mellitus affecting pregnancy in third trimester  O24.913 648.03     250.00   2. History of fetal biophysical profile  Z92.89 V15.89   3. At risk for fetal compromise  Z91.89 V15.89   4. Morbid obesity with BMI of 50.0-59.9, adult (CMS/[facility])  E66.01 278.01    Z68.43 V85.43   5. Uterine fibroid in pregnancy  O34.10 654.10    D25.9 218.9   6. At risk for hypertension  Z91.89 V15.89       Past obstetric, gynecological, medical, surgical, family and social history reviewed; no changes made.     Review of systems  Constitutional:  No Weight Change, No Fever, No Chills, No Fatigue, No Malaise  ENT/Mouth:  No Hearing Changes, No Sinus Pain, No Hoarseness, No sore throat, No   Eyes:  No Eye Pain, No Vision Changes  Cardiovascular:  No Chest Pain, No SOB, No Palpitations  Respiratory:  No Cough, No Wheezing, No Dyspnea  Gastrointestinal:  No Nausea, No Vomiting, No Diarrhea, No Constipation, No Pain   Genitourinary:   No Dysuria, No Urinary Frequency, No Hematuria, No Flank Pain  Musculoskeletal:  No Arthralgias, No Myalgias,   Skin:  No Skin Lesions, No Pruritis,   Neuro:  No Weakness, No Numbness, No Loss of Consciousness, No Syncope  Psych:  No Memory Changes, No Violence/Abuse Hx., No Eating Concerns  Heme/Lymph:  No Bruising, No Bleeding  Endocrine:   No Temperature Intolerance    There  were no vitals filed for this visit.    PHYSICAL EXAM   GENERAL: Not in acute distress, gravid   NEURO: awake, alert and oriented to person, place, and time  ABDOMINAL: No fundal tenderness, no rebound or guarding   EXTREMITIES: Bilaterally lower extremities No edema, no tenderness  PELVIC: No obvious vaginal discharge, no bleeding    ULTRASOUND   Please view full ultrasound note on Imaging tab in [employer].      ASSESSMENT   30 y.o.  at 36 3/7  weeks gestation (Estimated Date of Delivery: 21), reassuring fetal and maternal status.     1. Single live intrauterine pregnancy   [ X ] stable  [   ] improving [  ] worsening      2. Gestational Diabetes - likely Pregestational diabetes (GCT > 210)   [   ] stable  [  X ] improving [  ] worsening  [  ]  well-controlled  [  ] good-control  [ X ] fair-control [  ] poorly-controlled  [  ] non-compliant     INSULIN MEDICATION   NPH 50 units in PM      Glucose log  Fasting > 50% normal  Postprandial values   Breakfast  > 50% normal  Lunch > 50% normal  Dinner > 50% normal     -Reviewed goals of fasting 60 - 95 and 2 hour postprandial< 120 throughout pregnancy.  -Counseled regarding risk of hypoglycemia (maternal and fetal risks)   -Recommend emailing/fax/submitting her log weekly for review to her primary OB office; to bring log to all MFM office visits.     3. Obesity in pregnancy - see prior consultation   [ X ] stable  [   ] improving [  ] worsening     4. Fibroid in pregnancy - see prior consultation   [ X ] stable  [   ] improving [  ] worsening           No changes in INSULIN MEDICATION         MANAGEMENT PLAN  -No need to start insulin with meals as Postprandial values > 50% normal  -Continue Nighttime regimen as listed above   -Recommend contacting her primary OB office if >50% of any values are abnormal or any are over 200.   -Weekly fetal  surveillance until delivery - with MFM given increased risk of poorly controlled glucose values and maternal /  "fetal morbidity   -LOG given -  Fetal movement instructions given continue daily until delivery; instructed to report to labor and delivery if cannot achieve more than 10 kicks in one hour or if she perceives a decrease in fetal movement  -If estimated fetal weight >4500gm at time of delivery, would recommend  delivery counseling - currently EFW is at 2387g (21)      DELIVERY MANAGEMENT   Per ACOG, pregestational diabetes individualized based on her current and past control - 38 0/7 -  38 6/7 weeks gestation     Patient is aware that induction timing may change based on glucose control and fetal assessment in the next 2 weeks prior to induction date.      Counseled patient that poorly controlled maternal diabetes in pregnancy may delay fetal pulmonary maturation. However the risk of IUFD should be weighed with potential delay in fetal pulmonary maturity (and need for NICU) when making decision regarding delivery timing. Considering  the large amounts of insulin required, increases in dosages, and increased risk of IUFD, may recommend delivery 37 - 38 weeks; sooner if fetal assessment is unfavorable or for any other clinical indication. Patient is aware that NICU care may be needed with the delivery of her term infant.       betamethasone between 34 - 36 6/7 weeks gestation  A 2016, multicenter, randomized trial demonstrated that \"administration of  betamethasone in women at risk for late  delivery significantly decreased the rate of respiratory complications in newborns. Betamethasone administration significantly increased the rate of  hypoglycemia but not the rates of other maternal or  complications.\" It should be noted that pre-gestational diabetics were excluded from this study. (n engl j med 374;14.2016) - would recommend against giving steroids for fetal lung maturity acceleration in preparation for term induction     This note has been routed to " the referring obstetricians/medical provider.   At the end of the consultation, all patient questions were answered, concerns addressed, and comprehensive management plan and followup given to patient.     Thank you for your clinical consult.     DANK GARCIA MD MPH FACOG  MATERNAL FETAL MEDICINE

## 2021-05-20 ENCOUNTER — TRANSCRIBE ORDERS (OUTPATIENT)
Dept: ULTRASOUND IMAGING | Facility: HOSPITAL | Age: 31
End: 2021-05-20

## 2021-05-20 ENCOUNTER — HOSPITAL ENCOUNTER (OUTPATIENT)
Dept: ULTRASOUND IMAGING | Facility: HOSPITAL | Age: 31
Discharge: HOME | End: 2021-05-20
Admitting: OBSTETRICS & GYNECOLOGY

## 2021-05-20 ENCOUNTER — OFFICE VISIT (OUTPATIENT)
Dept: OBSTETRICS AND GYNECOLOGY | Facility: CLINIC | Age: 31
End: 2021-05-20

## 2021-05-20 VITALS
HEIGHT: 65 IN | SYSTOLIC BLOOD PRESSURE: 118 MMHG | HEART RATE: 95 BPM | BODY MASS INDEX: 48.82 KG/M2 | WEIGHT: 293 LBS | DIASTOLIC BLOOD PRESSURE: 65 MMHG | TEMPERATURE: 97.7 F

## 2021-05-20 DIAGNOSIS — O34.10 UTERINE FIBROID IN PREGNANCY: ICD-10-CM

## 2021-05-20 DIAGNOSIS — D25.9 UTERINE FIBROID IN PREGNANCY: ICD-10-CM

## 2021-05-20 DIAGNOSIS — O24.913 DIABETES MELLITUS AFFECTING PREGNANCY IN THIRD TRIMESTER: Primary | ICD-10-CM

## 2021-05-20 DIAGNOSIS — Z91.89 AT RISK FOR HYPERTENSION: ICD-10-CM

## 2021-05-20 DIAGNOSIS — O24.913 DIABETES MELLITUS DURING PREGNANCY IN THIRD TRIMESTER, UNSPECIFIED DIABETES MELLITUS TYPE: Primary | ICD-10-CM

## 2021-05-20 DIAGNOSIS — O99.212 MATERNAL OBESITY, ANTEPARTUM, SECOND TRIMESTER: ICD-10-CM

## 2021-05-20 DIAGNOSIS — Z92.89 HISTORY OF FETAL BIOPHYSICAL PROFILE: ICD-10-CM

## 2021-05-20 DIAGNOSIS — Z91.89 AT RISK FOR FETAL COMPROMISE: ICD-10-CM

## 2021-05-20 DIAGNOSIS — O24.913 DIABETES MELLITUS DURING PREGNANCY IN THIRD TRIMESTER, UNSPECIFIED DIABETES MELLITUS TYPE: ICD-10-CM

## 2021-05-20 DIAGNOSIS — E66.01 MORBID OBESITY WITH BMI OF 50.0-59.9, ADULT (HCC): ICD-10-CM

## 2021-05-20 PROCEDURE — 99214 OFFICE O/P EST MOD 30 MIN: CPT | Performed by: OBSTETRICS & GYNECOLOGY

## 2021-05-20 PROCEDURE — 76819 FETAL BIOPHYS PROFIL W/O NST: CPT | Performed by: OBSTETRICS & GYNECOLOGY

## 2021-05-20 PROCEDURE — 76819 FETAL BIOPHYS PROFIL W/O NST: CPT

## 2021-05-20 NOTE — PROGRESS NOTES
MATERNAL FETAL MEDICINE OUTPATIENT NOTE     Dear Dr BERONICA Snow    This is a followup visit.     Thank you for requesting my service to provide consultation for Radha Shipley is a 30 y.o.   at  37 3/7 weeks gestation (Estimated Date of Delivery: 21).   She is being seen for:diabetes in pregnancy management, medication management, and fetal assessment.   Maintains daily fetal movement counts reports > 10 movements per hour.     Prior notes from the patient's obstetrician were reviewed.     Today, she denies headache, blurry vision, RUQ pain. No vaginal bleeding, no contractions.     Chief complaint    ICD-10-CM ICD-9-CM   1. Diabetes mellitus affecting pregnancy in third trimester  O24.913 648.03     250.00   2. History of fetal biophysical profile  Z92.89 V15.89   3. At risk for fetal compromise  Z91.89 V15.89   4. Morbid obesity with BMI of 50.0-59.9, adult (CMS/[facility])  E66.01 278.01    Z68.43 V85.43   5. Uterine fibroid in pregnancy  O34.10 654.10    D25.9 218.9   6. At risk for hypertension  Z91.89 V15.89       Past obstetric, gynecological, medical, surgical, family and social history reviewed; no changes made.     Review of systems  Constitutional:  No Weight Change, No Fever, No Chills, No Fatigue, No Malaise  ENT/Mouth:  No Hearing Changes, No Sinus Pain, No Hoarseness, No sore throat, No   Eyes:  No Eye Pain, No Vision Changes  Cardiovascular:  No Chest Pain, No SOB, No Palpitations  Respiratory:  No Cough, No Wheezing, No Dyspnea  Gastrointestinal:  No Nausea, No Vomiting, No Diarrhea, No Constipation, No Pain   Genitourinary:   No Dysuria, No Urinary Frequency, No Hematuria, No Flank Pain  Musculoskeletal:  No Arthralgias, No Myalgias,   Skin:  No Skin Lesions, No Pruritis,   Neuro:  No Weakness, No Numbness, No Loss of Consciousness, No Syncope  Psych:  No Memory Changes, No Violence/Abuse Hx., No Eating Concerns  Heme/Lymph:  No Bruising, No Bleeding  Endocrine:   No Temperature  "Intolerance    Vitals:    21 1007   BP: 118/65   BP Location: Right arm   Patient Position: Sitting   Cuff Size: Large Adult   Pulse: 95   Temp: 97.7 °F (36.5 °C)   TempSrc: Infrared   Weight: (!) 153 kg (337 lb)   Height: 165.1 cm (65\")       PHYSICAL EXAM   GENERAL: Not in acute distress, gravid   NEURO: awake, alert and oriented to person, place, and time  ABDOMINAL: No fundal tenderness, no rebound or guarding   EXTREMITIES: Bilaterally lower extremities No edema, no tenderness  PELVIC: No obvious vaginal discharge, no bleeding    ULTRASOUND   Please view full ultrasound note on Imaging tab in [employer].      ASSESSMENT   30 y.o.   at  37 3/7 weeks gestation (Estimated Date of Delivery: 21), reassuring fetal and maternal status.     1. Single live intrauterine pregnancy   [ X ] stable  [   ] improving [  ] worsening       2. Gestational Diabetes - likely Pregestational diabetes (GCT > 210)   [  X ] stable  [  ] improving [  ] worsening  [  ]  well-controlled  [ X ] good-control  [  ] fair-control [  ] poorly-controlled  [  ] non-compliant     INSULIN MEDICATION   NPH 50 units in PM      Glucose log  Fasting > 50% normal  Postprandial values   Breakfast  > 50% normal  Lunch > 50% normal  Dinner > 50% normal     - Counseled regarding eating more vegetables, as her diet consists predominantly of dairy and protein    -Reviewed goals of fasting 60 - 95 and 2 hour postprandial< 120 throughout pregnancy.  -Counseled regarding risk of hypoglycemia (maternal and fetal risks)   -Recommend emailing/fax/submitting her log weekly for review to her primary OB office; to bring log to all MFM office visits.     3. Obesity in pregnancy - see prior consultation   [ X ] stable  [   ] improving [  ] worsening     4. Fibroid in pregnancy - see prior consultation   [ X ] stable  [   ] improving [  ] worsening           No changes in INSULIN MEDICATION         MANAGEMENT PLAN  -Recommend contacting her primary OB office " if >50% of any values are abnormal or any are over 200.   -Weekly fetal  surveillance until delivery - with MFM given increased risk of poorly controlled glucose values and maternal / fetal morbidity   -LOG given -  Fetal movement instructions given continue daily until delivery; instructed to report to labor and delivery if cannot achieve more than 10 kicks in one hour or if she perceives a decrease in fetal movement  -Estimated fetal weight EFW is at 2387g (21)      DELIVERY MANAGEMENT   Per ACOG, pregestational diabetes individualized based on her current and past control - 38 0/7 -  38 6/7 weeks gestation    Induction scheduled on 21    This note has been routed to the referring obstetricians/medical provider.   At the end of the consultation, all patient questions were answered, concerns addressed, and comprehensive management plan and followup given to patient.     Thank you for your clinical consult.     DANK GARCIA MD MPH FACOG  MATERNAL FETAL MEDICINE

## 2021-05-27 ENCOUNTER — OFFICE VISIT (OUTPATIENT)
Dept: OBSTETRICS AND GYNECOLOGY | Facility: CLINIC | Age: 31
End: 2021-05-27

## 2021-05-27 ENCOUNTER — ANESTHESIA EVENT (OUTPATIENT)
Dept: LABOR AND DELIVERY | Facility: HOSPITAL | Age: 31
End: 2021-05-27

## 2021-05-27 ENCOUNTER — HOSPITAL ENCOUNTER (INPATIENT)
Facility: HOSPITAL | Age: 31
LOS: 5 days | Discharge: HOME | End: 2021-06-01
Attending: OBSTETRICS & GYNECOLOGY | Admitting: OBSTETRICS & GYNECOLOGY

## 2021-05-27 ENCOUNTER — ANESTHESIA (OUTPATIENT)
Dept: LABOR AND DELIVERY | Facility: HOSPITAL | Age: 31
End: 2021-05-27

## 2021-05-27 ENCOUNTER — HOSPITAL ENCOUNTER (OUTPATIENT)
Dept: ULTRASOUND IMAGING | Facility: HOSPITAL | Age: 31
Discharge: HOME | End: 2021-05-27

## 2021-05-27 ENCOUNTER — HOSPITAL ENCOUNTER (OUTPATIENT)
Dept: LABOR AND DELIVERY | Facility: HOSPITAL | Age: 31
Discharge: HOME | End: 2021-05-27

## 2021-05-27 VITALS
TEMPERATURE: 97.5 F | WEIGHT: 293 LBS | HEIGHT: 65 IN | DIASTOLIC BLOOD PRESSURE: 66 MMHG | SYSTOLIC BLOOD PRESSURE: 119 MMHG | BODY MASS INDEX: 48.82 KG/M2 | HEART RATE: 93 BPM

## 2021-05-27 DIAGNOSIS — O24.913 DIABETES MELLITUS DURING PREGNANCY IN THIRD TRIMESTER, UNSPECIFIED DIABETES MELLITUS TYPE: ICD-10-CM

## 2021-05-27 DIAGNOSIS — Z92.89 HISTORY OF FETAL BIOPHYSICAL PROFILE: ICD-10-CM

## 2021-05-27 DIAGNOSIS — D25.9 UTERINE FIBROID IN PREGNANCY: ICD-10-CM

## 2021-05-27 DIAGNOSIS — O34.10 UTERINE FIBROID IN PREGNANCY: ICD-10-CM

## 2021-05-27 DIAGNOSIS — E66.01 MORBID OBESITY WITH BMI OF 50.0-59.9, ADULT (HCC): ICD-10-CM

## 2021-05-27 DIAGNOSIS — Z91.89 AT RISK FOR HYPERTENSION: ICD-10-CM

## 2021-05-27 DIAGNOSIS — O24.913 DIABETES MELLITUS AFFECTING PREGNANCY IN THIRD TRIMESTER: Primary | ICD-10-CM

## 2021-05-27 DIAGNOSIS — Z91.89 AT RISK FOR FETAL COMPROMISE: ICD-10-CM

## 2021-05-27 PROBLEM — Z34.90 PREGNANCY: Status: ACTIVE | Noted: 2021-05-27

## 2021-05-27 LAB
ABO GROUP BLD: NORMAL
ALBUMIN SERPL-MCNC: 3.4 G/DL (ref 3.5–5.2)
ALBUMIN/GLOB SERPL: 1 G/DL
ALP SERPL-CCNC: 145 U/L (ref 39–117)
ALT SERPL W P-5'-P-CCNC: 14 U/L (ref 1–33)
ANION GAP SERPL CALCULATED.3IONS-SCNC: 11 MMOL/L (ref 5–15)
AST SERPL-CCNC: 18 U/L (ref 1–32)
BILIRUB SERPL-MCNC: 0.5 MG/DL (ref 0–1.2)
BLD GP AB SCN SERPL QL: NEGATIVE
BUN SERPL-MCNC: 7 MG/DL (ref 6–20)
BUN/CREAT SERPL: 9.5 (ref 7–25)
CALCIUM SPEC-SCNC: 9.1 MG/DL (ref 8.6–10.5)
CHLORIDE SERPL-SCNC: 105 MMOL/L (ref 98–107)
CO2 SERPL-SCNC: 22 MMOL/L (ref 22–29)
CREAT SERPL-MCNC: 0.74 MG/DL (ref 0.57–1)
DEPRECATED RDW RBC AUTO: 46.2 FL (ref 37–54)
ERYTHROCYTE [DISTWIDTH] IN BLOOD BY AUTOMATED COUNT: 13.8 % (ref 12.3–15.4)
GFR SERPL CREATININE-BSD FRML MDRD: 112 ML/MIN/1.73
GLOBULIN UR ELPH-MCNC: 3.5 GM/DL
GLUCOSE BLDC GLUCOMTR-MCNC: 86 MG/DL (ref 70–130)
GLUCOSE SERPL-MCNC: 87 MG/DL (ref 65–99)
HCT VFR BLD AUTO: 37.2 % (ref 34–46.6)
HGB BLD-MCNC: 12.4 G/DL (ref 12–15.9)
MCH RBC QN AUTO: 30.6 PG (ref 26.6–33)
MCHC RBC AUTO-ENTMCNC: 33.3 G/DL (ref 31.5–35.7)
MCV RBC AUTO: 91.9 FL (ref 79–97)
PLATELET # BLD AUTO: 275 10*3/MM3 (ref 140–450)
PMV BLD AUTO: 9.6 FL (ref 6–12)
POTASSIUM SERPL-SCNC: 3.8 MMOL/L (ref 3.5–5.2)
PROT SERPL-MCNC: 6.9 G/DL (ref 6–8.5)
RBC # BLD AUTO: 4.05 10*6/MM3 (ref 3.77–5.28)
RH BLD: POSITIVE
SARS-COV-2 RNA RESP QL NAA+PROBE: NOT DETECTED
SODIUM SERPL-SCNC: 138 MMOL/L (ref 136–145)
T&S EXPIRATION DATE: NORMAL
WBC # BLD AUTO: 9.38 10*3/MM3 (ref 3.4–10.8)

## 2021-05-27 PROCEDURE — 86901 BLOOD TYPING SEROLOGIC RH(D): CPT | Performed by: OBSTETRICS & GYNECOLOGY

## 2021-05-27 PROCEDURE — 76819 FETAL BIOPHYS PROFIL W/O NST: CPT | Performed by: OBSTETRICS & GYNECOLOGY

## 2021-05-27 PROCEDURE — 76816 OB US FOLLOW-UP PER FETUS: CPT | Performed by: OBSTETRICS & GYNECOLOGY

## 2021-05-27 PROCEDURE — 76816 OB US FOLLOW-UP PER FETUS: CPT

## 2021-05-27 PROCEDURE — U0003 INFECTIOUS AGENT DETECTION BY NUCLEIC ACID (DNA OR RNA); SEVERE ACUTE RESPIRATORY SYNDROME CORONAVIRUS 2 (SARS-COV-2) (CORONAVIRUS DISEASE [COVID-19]), AMPLIFIED PROBE TECHNIQUE, MAKING USE OF HIGH THROUGHPUT TECHNOLOGIES AS DESCRIBED BY CMS-2020-01-R: HCPCS | Performed by: OBSTETRICS & GYNECOLOGY

## 2021-05-27 PROCEDURE — 86900 BLOOD TYPING SEROLOGIC ABO: CPT | Performed by: OBSTETRICS & GYNECOLOGY

## 2021-05-27 PROCEDURE — 82962 GLUCOSE BLOOD TEST: CPT

## 2021-05-27 PROCEDURE — 85027 COMPLETE CBC AUTOMATED: CPT | Performed by: OBSTETRICS & GYNECOLOGY

## 2021-05-27 PROCEDURE — 99214 OFFICE O/P EST MOD 30 MIN: CPT | Performed by: OBSTETRICS & GYNECOLOGY

## 2021-05-27 PROCEDURE — 76819 FETAL BIOPHYS PROFIL W/O NST: CPT

## 2021-05-27 PROCEDURE — 80053 COMPREHEN METABOLIC PANEL: CPT | Performed by: OBSTETRICS & GYNECOLOGY

## 2021-05-27 PROCEDURE — 86850 RBC ANTIBODY SCREEN: CPT | Performed by: OBSTETRICS & GYNECOLOGY

## 2021-05-27 RX ORDER — SODIUM CHLORIDE, SODIUM LACTATE, POTASSIUM CHLORIDE, CALCIUM CHLORIDE 600; 310; 30; 20 MG/100ML; MG/100ML; MG/100ML; MG/100ML
125 INJECTION, SOLUTION INTRAVENOUS CONTINUOUS
Status: DISCONTINUED | OUTPATIENT
Start: 2021-05-27 | End: 2021-05-29

## 2021-05-27 RX ORDER — EPHEDRINE SULFATE 50 MG/ML
5 INJECTION, SOLUTION INTRAVENOUS AS NEEDED
Status: DISCONTINUED | OUTPATIENT
Start: 2021-05-27 | End: 2021-05-29

## 2021-05-27 RX ORDER — DIPHENHYDRAMINE HYDROCHLORIDE 50 MG/ML
12.5 INJECTION INTRAMUSCULAR; INTRAVENOUS EVERY 8 HOURS PRN
Status: DISCONTINUED | OUTPATIENT
Start: 2021-05-27 | End: 2021-05-29

## 2021-05-27 RX ORDER — ONDANSETRON 2 MG/ML
4 INJECTION INTRAMUSCULAR; INTRAVENOUS ONCE AS NEEDED
Status: COMPLETED | OUTPATIENT
Start: 2021-05-27 | End: 2021-05-28

## 2021-05-27 RX ORDER — MAGNESIUM CARB/ALUMINUM HYDROX 105-160MG
30 TABLET,CHEWABLE ORAL ONCE
Status: DISCONTINUED | OUTPATIENT
Start: 2021-05-27 | End: 2021-05-29

## 2021-05-27 RX ORDER — FAMOTIDINE 10 MG/ML
20 INJECTION, SOLUTION INTRAVENOUS ONCE AS NEEDED
Status: COMPLETED | OUTPATIENT
Start: 2021-05-27 | End: 2021-05-29

## 2021-05-27 RX ADMIN — SODIUM CHLORIDE, POTASSIUM CHLORIDE, SODIUM LACTATE AND CALCIUM CHLORIDE 125 ML/HR: 600; 310; 30; 20 INJECTION, SOLUTION INTRAVENOUS at 18:15

## 2021-05-27 RX ADMIN — DINOPROSTONE 10 MG: 10 INSERT VAGINAL at 18:50

## 2021-05-27 NOTE — PROGRESS NOTES
MATERNAL FETAL MEDICINE OUTPATIENT NOTE     Dear Dr BERONICA Snow     This is a followup visit.     Thank you for requesting my service to provide consultation for Radha Shipley is a 30 y.o.   at  38 3/7 weeks gestation (Estimated Date of Delivery: 21).   She is being seen for:diabetes in pregnancy management, medication management, and fetal assessment.     Prior notes from the patient's obstetrician were reviewed.     Today, she denies headache, blurry vision, RUQ pain. No vaginal bleeding, no contractions.     Chief complaint    ICD-10-CM ICD-9-CM   1. Diabetes mellitus affecting pregnancy in third trimester  O24.913 648.03     250.00   2. History of fetal biophysical profile  Z92.89 V15.89   3. At risk for fetal compromise  Z91.89 V15.89   4. At risk for hypertension  Z91.89 V15.89   5. Morbid obesity with BMI of 50.0-59.9, adult (CMS/[facility])  E66.01 278.01    Z68.43 V85.43   6. Uterine fibroid in pregnancy  O34.10 654.10    D25.9 218.9       Past obstetric, gynecological, medical, surgical, family and social history reviewed; no changes made.     Review of systems  Constitutional:  No Weight Change, No Fever, No Chills, No Fatigue, No Malaise  ENT/Mouth:  No Hearing Changes, No Sinus Pain, No Hoarseness, No sore throat, No   Eyes:  No Eye Pain, No Vision Changes  Cardiovascular:  No Chest Pain, No SOB, No Palpitations  Respiratory:  No Cough, No Wheezing, No Dyspnea  Gastrointestinal:  No Nausea, No Vomiting, No Diarrhea, No Constipation, No Pain   Genitourinary:   No Dysuria, No Urinary Frequency, No Hematuria, No Flank Pain  Musculoskeletal:  No Arthralgias, No Myalgias,   Skin:  No Skin Lesions, No Pruritis,   Neuro:  No Weakness, No Numbness, No Loss of Consciousness, No Syncope  Psych:  No Memory Changes, No Violence/Abuse Hx., No Eating Concerns  Heme/Lymph:  No Bruising, No Bleeding  Endocrine:   No Temperature Intolerance    Vitals:    21 0936   BP: 119/66   BP Location: Right arm   Patient  "Position: Sitting   Cuff Size: Large Adult   Pulse: 93   Temp: 97.5 °F (36.4 °C)   TempSrc: Infrared   Weight: (!) 152 kg (335 lb)   Height: 165.1 cm (65\")       PHYSICAL EXAM   GENERAL: Not in acute distress, gravid   NEURO: awake, alert and oriented to person, place, and time  ABDOMINAL: No fundal tenderness, no rebound or guarding   EXTREMITIES: Bilaterally lower extremities No edema, no tenderness  PELVIC: No obvious vaginal discharge, no bleeding    ULTRASOUND   Please view full ultrasound note on Imaging tab in [employer].      ASSESSMENT   30 y.o.   at  38 3/7 weeks gestation (Estimated Date of Delivery: 21), reassuring fetal and maternal status.     1. Single live intrauterine pregnancy - appropriate interval growth no evidence of LGA   [ X ] stable  [   ] improving [  ] worsening    Estimated fetal weight 2,961g   Abdominal circumference 13th percentile.         2. Gestational Diabetes - likely Pregestational diabetes (GCT > 210)   [  X ] stable  [  ] improving [  ] worsening  [  ]  well-controlled  [ X ] good-control  [  ] fair-control [  ] poorly-controlled  [  ] non-compliant     INSULIN MEDICATION   NPH 50 units in PM      Glucose log  Fasting > 50% normal  Postprandial values   Breakfast  > 50% normal  Lunch > 50% normal  Dinner > 50% normal       3. Obesity in pregnancy - see prior consultation   [ X ] stable  [   ] improving [  ] worsening     4. Fibroid in pregnancy - see prior consultation   [ X ] stable  [   ] improving [  ] worsening           Stop insulin - given scheduled induction later today     Postpartum insulin suggestions.  - sliding scale suggested if needed - once resuming full meals     Lispro or Aspart insulin sliding scale  For glucose values:  Less than 150 then do nothing   151 - 170 then give 4 units of Lispro or Aspart  171 - 190 then give 6 units of Lispro or Aspart   191 - 200 then give 8 units of Lispro or Aspart   > 201 units, then give 10 units of Lispro or Aspart AND " call MFM immediately           MANAGEMENT PLAN  Per ACOG, pregestational diabetes individualized based on her current and past control - 38 0/7 -  38 6/7 weeks gestation     Induction scheduled today  5/27/21    This note has been routed to the referring obstetricians/medical provider.   At the end of the consultation, all patient questions were answered, concerns addressed, and comprehensive management plan and followup given to patient.     Thank you for your clinical consult.     DANK GARCIA MD MPH FACOG  MATERNAL FETAL MEDICINE

## 2021-05-28 LAB
GLUCOSE BLDC GLUCOMTR-MCNC: 100 MG/DL (ref 70–130)
GLUCOSE BLDC GLUCOMTR-MCNC: 70 MG/DL (ref 70–130)
GLUCOSE BLDC GLUCOMTR-MCNC: 89 MG/DL (ref 70–130)
GLUCOSE BLDC GLUCOMTR-MCNC: 91 MG/DL (ref 70–130)
GLUCOSE BLDC GLUCOMTR-MCNC: 92 MG/DL (ref 70–130)
GLUCOSE BLDC GLUCOMTR-MCNC: 94 MG/DL (ref 70–130)

## 2021-05-28 PROCEDURE — 25010000002 ONDANSETRON PER 1 MG: Performed by: ANESTHESIOLOGY

## 2021-05-28 PROCEDURE — 82962 GLUCOSE BLOOD TEST: CPT

## 2021-05-28 PROCEDURE — C1755 CATHETER, INTRASPINAL: HCPCS | Performed by: ANESTHESIOLOGY

## 2021-05-28 RX ORDER — OXYTOCIN-SODIUM CHLORIDE 0.9% IV SOLN 30 UNIT/500ML 30-0.9/5 UT/ML-%
2-20 SOLUTION INTRAVENOUS
Status: DISCONTINUED | OUTPATIENT
Start: 2021-05-28 | End: 2021-05-29

## 2021-05-28 RX ADMIN — SODIUM CHLORIDE 500 ML: 9 INJECTION, SOLUTION INTRAVENOUS at 23:13

## 2021-05-28 RX ADMIN — FENTANYL CITRATE 10 ML/HR: 0.05 INJECTION, SOLUTION INTRAMUSCULAR; INTRAVENOUS at 17:43

## 2021-05-28 RX ADMIN — ONDANSETRON 4 MG: 2 INJECTION INTRAMUSCULAR; INTRAVENOUS at 21:17

## 2021-05-28 RX ADMIN — SODIUM CHLORIDE, POTASSIUM CHLORIDE, SODIUM LACTATE AND CALCIUM CHLORIDE 125 ML/HR: 600; 310; 30; 20 INJECTION, SOLUTION INTRAVENOUS at 10:03

## 2021-05-28 RX ADMIN — SODIUM CHLORIDE, POTASSIUM CHLORIDE, SODIUM LACTATE AND CALCIUM CHLORIDE 1000 ML: 600; 310; 30; 20 INJECTION, SOLUTION INTRAVENOUS at 17:32

## 2021-05-28 RX ADMIN — SODIUM CHLORIDE, POTASSIUM CHLORIDE, SODIUM LACTATE AND CALCIUM CHLORIDE 125 ML/HR: 600; 310; 30; 20 INJECTION, SOLUTION INTRAVENOUS at 22:07

## 2021-05-28 RX ADMIN — OXYTOCIN 2 MILLI-UNITS/MIN: 10 INJECTION, SOLUTION INTRAMUSCULAR; INTRAVENOUS at 09:08

## 2021-05-28 NOTE — ANESTHESIA PREPROCEDURE EVALUATION
Anesthesia Evaluation     Patient summary reviewed and Nursing notes reviewed                Airway   Mallampati: II  TM distance: >3 FB  Neck ROM: full  no difficulty expected  Dental - normal exam     Pulmonary - normal exam   Cardiovascular - normal exam        Neuro/Psych  GI/Hepatic/Renal/Endo    (+)   diabetes mellitus gestational using insulin,     Musculoskeletal     Abdominal  - normal exam   Substance History      OB/GYN    (+) Pregnant,         Other                        Anesthesia Plan    ASA 3     epidural   (  38w4d  )    Anesthetic plan, all risks, benefits, and alternatives have been provided, discussed and informed consent has been obtained with: patient.

## 2021-05-28 NOTE — ANESTHESIA PROCEDURE NOTES
Labor Epidural      Patient reassessed immediately prior to procedure    Patient location during procedure: OB  Start Time: 5/28/2021 5:32 PM  Stop Time: 5/28/2021 5:43 PM  Indication:at surgeon's request  Performed By  Anesthesiologist: Sahil Echavarria MD  Preanesthetic Checklist  Completed: patient identified, IV checked, site marked, risks and benefits discussed, surgical consent, monitors and equipment checked, pre-op evaluation and timeout performed  Prep:  Pt Position:sitting  Sterile Tech:cap, gloves and mask  Prep:povidone-iodine 7.5% surgical scrub  Monitoring:blood pressure monitoring, continuous pulse oximetry and EKG  Epidural Block Procedure:  Approach:midline  Guidance:landmark technique  Location:L4-L5  Needle Type:Tuohy  Needle Gauge:17 and 17 G  Loss of Resistance Medium: air  Loss of Resistance: 7cm  Cath Depth at skin:10 cm  Paresthesia: none  Aspiration:negative  Test Dose:negative  Number of Attempts: 1  Post Assessment:  Dressing:occlusive dressing applied and secured with tape  Pt Tolerance:patient tolerated the procedure well with no apparent complications  Complications:no

## 2021-05-29 LAB
BASOPHILS # BLD AUTO: 0.01 10*3/MM3 (ref 0–0.2)
BASOPHILS NFR BLD AUTO: 0.1 % (ref 0–1.5)
DEPRECATED RDW RBC AUTO: 46.6 FL (ref 37–54)
EOSINOPHIL # BLD AUTO: 0 10*3/MM3 (ref 0–0.4)
EOSINOPHIL NFR BLD AUTO: 0 % (ref 0.3–6.2)
ERYTHROCYTE [DISTWIDTH] IN BLOOD BY AUTOMATED COUNT: 13.8 % (ref 12.3–15.4)
GLUCOSE BLDC GLUCOMTR-MCNC: 125 MG/DL (ref 70–130)
GLUCOSE BLDC GLUCOMTR-MCNC: 151 MG/DL (ref 70–130)
GLUCOSE BLDC GLUCOMTR-MCNC: 95 MG/DL (ref 70–130)
HCT VFR BLD AUTO: 36.8 % (ref 34–46.6)
HGB BLD-MCNC: 12.4 G/DL (ref 12–15.9)
IMM GRANULOCYTES # BLD AUTO: 0.06 10*3/MM3 (ref 0–0.05)
IMM GRANULOCYTES NFR BLD AUTO: 0.5 % (ref 0–0.5)
LYMPHOCYTES # BLD AUTO: 2.07 10*3/MM3 (ref 0.7–3.1)
LYMPHOCYTES NFR BLD AUTO: 15.9 % (ref 19.6–45.3)
MCH RBC QN AUTO: 31.1 PG (ref 26.6–33)
MCHC RBC AUTO-ENTMCNC: 33.7 G/DL (ref 31.5–35.7)
MCV RBC AUTO: 92.2 FL (ref 79–97)
MONOCYTES # BLD AUTO: 0.62 10*3/MM3 (ref 0.1–0.9)
MONOCYTES NFR BLD AUTO: 4.8 % (ref 5–12)
NEUTROPHILS NFR BLD AUTO: 10.25 10*3/MM3 (ref 1.7–7)
NEUTROPHILS NFR BLD AUTO: 78.7 % (ref 42.7–76)
NRBC BLD AUTO-RTO: 0 /100 WBC (ref 0–0.2)
PLATELET # BLD AUTO: 239 10*3/MM3 (ref 140–450)
PMV BLD AUTO: 9.6 FL (ref 6–12)
RBC # BLD AUTO: 3.99 10*6/MM3 (ref 3.77–5.28)
WBC # BLD AUTO: 13.01 10*3/MM3 (ref 3.4–10.8)

## 2021-05-29 PROCEDURE — 25010000002 ROPIVACAINE PER 1 MG: Performed by: ANESTHESIOLOGY

## 2021-05-29 PROCEDURE — 85025 COMPLETE CBC W/AUTO DIFF WBC: CPT | Performed by: OBSTETRICS & GYNECOLOGY

## 2021-05-29 PROCEDURE — 25010000002 AZITHROMYCIN 500 MG/250 ML: Performed by: OBSTETRICS & GYNECOLOGY

## 2021-05-29 PROCEDURE — 25010000002 PHENYLEPHRINE PER 1 ML: Performed by: NURSE ANESTHETIST, CERTIFIED REGISTERED

## 2021-05-29 PROCEDURE — 82962 GLUCOSE BLOOD TEST: CPT

## 2021-05-29 PROCEDURE — 25010000002 KETOROLAC TROMETHAMINE PER 15 MG: Performed by: OBSTETRICS & GYNECOLOGY

## 2021-05-29 PROCEDURE — 25010000002 ONDANSETRON PER 1 MG: Performed by: OBSTETRICS & GYNECOLOGY

## 2021-05-29 PROCEDURE — 25010000002 CEFAZOLIN PER 500 MG: Performed by: OBSTETRICS & GYNECOLOGY

## 2021-05-29 PROCEDURE — 25010000002 KETOROLAC TROMETHAMINE PER 15 MG: Performed by: NURSE ANESTHETIST, CERTIFIED REGISTERED

## 2021-05-29 PROCEDURE — 25010000002 MORPHINE PER 10 MG: Performed by: NURSE ANESTHETIST, CERTIFIED REGISTERED

## 2021-05-29 PROCEDURE — 25010000002 FENTANYL CITRATE (PF) 2500 MCG/50ML SOLUTION: Performed by: ANESTHESIOLOGY

## 2021-05-29 RX ORDER — ONDANSETRON 2 MG/ML
4 INJECTION INTRAMUSCULAR; INTRAVENOUS ONCE AS NEEDED
Status: DISCONTINUED | OUTPATIENT
Start: 2021-05-29 | End: 2021-05-29

## 2021-05-29 RX ORDER — HYDROCORTISONE 25 MG/G
CREAM TOPICAL 3 TIMES DAILY PRN
Status: DISCONTINUED | OUTPATIENT
Start: 2021-05-29 | End: 2021-06-01 | Stop reason: HOSPADM

## 2021-05-29 RX ORDER — ONDANSETRON 4 MG/1
4 TABLET, FILM COATED ORAL EVERY 8 HOURS PRN
Status: DISCONTINUED | OUTPATIENT
Start: 2021-05-29 | End: 2021-06-01 | Stop reason: HOSPADM

## 2021-05-29 RX ORDER — SODIUM CHLORIDE, SODIUM LACTATE, POTASSIUM CHLORIDE, CALCIUM CHLORIDE 600; 310; 30; 20 MG/100ML; MG/100ML; MG/100ML; MG/100ML
INJECTION, SOLUTION INTRAVENOUS CONTINUOUS PRN
Status: DISCONTINUED | OUTPATIENT
Start: 2021-05-29 | End: 2021-05-29 | Stop reason: SURG

## 2021-05-29 RX ORDER — MISOPROSTOL 200 UG/1
800 TABLET ORAL AS NEEDED
Status: DISCONTINUED | OUTPATIENT
Start: 2021-05-29 | End: 2021-05-29 | Stop reason: HOSPADM

## 2021-05-29 RX ORDER — ONDANSETRON 2 MG/ML
4 INJECTION INTRAMUSCULAR; INTRAVENOUS ONCE
Status: COMPLETED | OUTPATIENT
Start: 2021-05-29 | End: 2021-05-29

## 2021-05-29 RX ORDER — HYDROMORPHONE HYDROCHLORIDE 1 MG/ML
0.5 INJECTION, SOLUTION INTRAMUSCULAR; INTRAVENOUS; SUBCUTANEOUS
Status: ACTIVE | OUTPATIENT
Start: 2021-05-29 | End: 2021-05-30

## 2021-05-29 RX ORDER — DOCUSATE SODIUM 100 MG/1
100 CAPSULE, LIQUID FILLED ORAL 2 TIMES DAILY PRN
Status: DISCONTINUED | OUTPATIENT
Start: 2021-05-29 | End: 2021-06-01 | Stop reason: HOSPADM

## 2021-05-29 RX ORDER — OXYCODONE HYDROCHLORIDE AND ACETAMINOPHEN 5; 325 MG/1; MG/1
1 TABLET ORAL EVERY 4 HOURS PRN
Status: DISCONTINUED | OUTPATIENT
Start: 2021-05-29 | End: 2021-06-01 | Stop reason: HOSPADM

## 2021-05-29 RX ORDER — PHYTONADIONE 1 MG/.5ML
INJECTION, EMULSION INTRAMUSCULAR; INTRAVENOUS; SUBCUTANEOUS
Status: DISPENSED
Start: 2021-05-29 | End: 2021-05-29

## 2021-05-29 RX ORDER — METHYLERGONOVINE MALEATE 0.2 MG/ML
200 INJECTION INTRAVENOUS ONCE AS NEEDED
Status: DISCONTINUED | OUTPATIENT
Start: 2021-05-29 | End: 2021-05-29 | Stop reason: HOSPADM

## 2021-05-29 RX ORDER — HYDROCODONE BITARTRATE AND ACETAMINOPHEN 5; 325 MG/1; MG/1
1 TABLET ORAL EVERY 4 HOURS PRN
Status: ACTIVE | OUTPATIENT
Start: 2021-05-29 | End: 2021-05-30

## 2021-05-29 RX ORDER — MORPHINE SULFATE 1 MG/ML
INJECTION, SOLUTION EPIDURAL; INTRATHECAL; INTRAVENOUS AS NEEDED
Status: DISCONTINUED | OUTPATIENT
Start: 2021-05-29 | End: 2021-05-29 | Stop reason: SURG

## 2021-05-29 RX ORDER — CEFAZOLIN SODIUM IN 0.9 % NACL 3 G/100 ML
3 INTRAVENOUS SOLUTION, PIGGYBACK (ML) INTRAVENOUS ONCE
Status: COMPLETED | OUTPATIENT
Start: 2021-05-29 | End: 2021-05-29

## 2021-05-29 RX ORDER — OXYTOCIN-SODIUM CHLORIDE 0.9% IV SOLN 30 UNIT/500ML 30-0.9/5 UT/ML-%
999 SOLUTION INTRAVENOUS ONCE
Status: DISCONTINUED | OUTPATIENT
Start: 2021-05-29 | End: 2021-05-29 | Stop reason: HOSPADM

## 2021-05-29 RX ORDER — NALBUPHINE HCL 10 MG/ML
2.5 AMPUL (ML) INJECTION EVERY 4 HOURS PRN
Status: ACTIVE | OUTPATIENT
Start: 2021-05-29 | End: 2021-05-30

## 2021-05-29 RX ORDER — PROMETHAZINE HYDROCHLORIDE 25 MG/1
25 TABLET ORAL EVERY 6 HOURS PRN
Status: DISCONTINUED | OUTPATIENT
Start: 2021-05-29 | End: 2021-06-01 | Stop reason: HOSPADM

## 2021-05-29 RX ORDER — HYDROXYZINE 50 MG/1
50 TABLET, FILM COATED ORAL EVERY 6 HOURS PRN
Status: DISCONTINUED | OUTPATIENT
Start: 2021-05-29 | End: 2021-06-01 | Stop reason: HOSPADM

## 2021-05-29 RX ORDER — BUPIVACAINE HYDROCHLORIDE 5 MG/ML
INJECTION, SOLUTION EPIDURAL; INTRACAUDAL
Status: COMPLETED
Start: 2021-05-29 | End: 2021-05-29

## 2021-05-29 RX ORDER — SODIUM CHLORIDE 0.9 % (FLUSH) 0.9 %
3 SYRINGE (ML) INJECTION EVERY 12 HOURS SCHEDULED
Status: DISCONTINUED | OUTPATIENT
Start: 2021-05-29 | End: 2021-06-01 | Stop reason: HOSPADM

## 2021-05-29 RX ORDER — ERYTHROMYCIN 5 MG/G
OINTMENT OPHTHALMIC
Status: DISPENSED
Start: 2021-05-29 | End: 2021-05-29

## 2021-05-29 RX ORDER — LIDOCAINE HYDROCHLORIDE AND EPINEPHRINE 20; 5 MG/ML; UG/ML
INJECTION, SOLUTION EPIDURAL; INFILTRATION; INTRACAUDAL; PERINEURAL AS NEEDED
Status: DISCONTINUED | OUTPATIENT
Start: 2021-05-29 | End: 2021-05-29 | Stop reason: SURG

## 2021-05-29 RX ORDER — IBUPROFEN 800 MG/1
800 TABLET ORAL EVERY 8 HOURS PRN
Status: DISCONTINUED | OUTPATIENT
Start: 2021-05-29 | End: 2021-06-01 | Stop reason: HOSPADM

## 2021-05-29 RX ORDER — KETOROLAC TROMETHAMINE 30 MG/ML
30 INJECTION, SOLUTION INTRAMUSCULAR; INTRAVENOUS EVERY 6 HOURS
Status: COMPLETED | OUTPATIENT
Start: 2021-05-29 | End: 2021-05-30

## 2021-05-29 RX ORDER — DIPHENHYDRAMINE HCL 25 MG
25 CAPSULE ORAL EVERY 4 HOURS PRN
Status: DISCONTINUED | OUTPATIENT
Start: 2021-05-29 | End: 2021-06-01 | Stop reason: HOSPADM

## 2021-05-29 RX ORDER — ACETAMINOPHEN 500 MG
1000 TABLET ORAL ONCE
Status: COMPLETED | OUTPATIENT
Start: 2021-05-29 | End: 2021-05-29

## 2021-05-29 RX ORDER — CARBOPROST TROMETHAMINE 250 UG/ML
250 INJECTION, SOLUTION INTRAMUSCULAR AS NEEDED
Status: DISCONTINUED | OUTPATIENT
Start: 2021-05-29 | End: 2021-05-29 | Stop reason: HOSPADM

## 2021-05-29 RX ORDER — SODIUM CHLORIDE, SODIUM LACTATE, POTASSIUM CHLORIDE, CALCIUM CHLORIDE 600; 310; 30; 20 MG/100ML; MG/100ML; MG/100ML; MG/100ML
125 INJECTION, SOLUTION INTRAVENOUS CONTINUOUS
Status: DISCONTINUED | OUTPATIENT
Start: 2021-05-29 | End: 2021-06-01 | Stop reason: HOSPADM

## 2021-05-29 RX ORDER — BUPIVACAINE HYDROCHLORIDE 5 MG/ML
INJECTION, SOLUTION EPIDURAL; INTRACAUDAL AS NEEDED
Status: DISCONTINUED | OUTPATIENT
Start: 2021-05-29 | End: 2021-05-29 | Stop reason: SURG

## 2021-05-29 RX ORDER — IBUPROFEN 800 MG/1
400 TABLET ORAL EVERY 6 HOURS PRN
Status: DISCONTINUED | OUTPATIENT
Start: 2021-05-29 | End: 2021-05-29

## 2021-05-29 RX ORDER — OXYTOCIN-SODIUM CHLORIDE 0.9% IV SOLN 30 UNIT/500ML 30-0.9/5 UT/ML-%
250 SOLUTION INTRAVENOUS CONTINUOUS PRN
Status: ACTIVE | OUTPATIENT
Start: 2021-05-29 | End: 2021-05-29

## 2021-05-29 RX ORDER — SIMETHICONE 80 MG
80 TABLET,CHEWABLE ORAL 4 TIMES DAILY PRN
Status: DISCONTINUED | OUTPATIENT
Start: 2021-05-29 | End: 2021-06-01 | Stop reason: HOSPADM

## 2021-05-29 RX ORDER — ONDANSETRON 2 MG/ML
4 INJECTION INTRAMUSCULAR; INTRAVENOUS EVERY 8 HOURS PRN
Status: DISCONTINUED | OUTPATIENT
Start: 2021-05-29 | End: 2021-06-01 | Stop reason: HOSPADM

## 2021-05-29 RX ORDER — FAMOTIDINE 10 MG/ML
20 INJECTION, SOLUTION INTRAVENOUS ONCE AS NEEDED
Status: DISCONTINUED | OUTPATIENT
Start: 2021-05-29 | End: 2021-05-29 | Stop reason: HOSPADM

## 2021-05-29 RX ORDER — LANOLIN
CREAM (ML) TOPICAL
Status: DISCONTINUED | OUTPATIENT
Start: 2021-05-29 | End: 2021-06-01 | Stop reason: HOSPADM

## 2021-05-29 RX ORDER — HYDROCODONE BITARTRATE AND ACETAMINOPHEN 7.5; 325 MG/1; MG/1
2 TABLET ORAL EVERY 4 HOURS PRN
Status: ACTIVE | OUTPATIENT
Start: 2021-05-29 | End: 2021-05-30

## 2021-05-29 RX ORDER — OXYCODONE AND ACETAMINOPHEN 10; 325 MG/1; MG/1
1 TABLET ORAL EVERY 4 HOURS PRN
Status: DISCONTINUED | OUTPATIENT
Start: 2021-05-29 | End: 2021-06-01 | Stop reason: HOSPADM

## 2021-05-29 RX ORDER — OXYTOCIN-SODIUM CHLORIDE 0.9% IV SOLN 30 UNIT/500ML 30-0.9/5 UT/ML-%
125 SOLUTION INTRAVENOUS CONTINUOUS PRN
Status: COMPLETED | OUTPATIENT
Start: 2021-05-29 | End: 2021-05-29

## 2021-05-29 RX ORDER — SODIUM CHLORIDE 0.9 % (FLUSH) 0.9 %
3-10 SYRINGE (ML) INJECTION AS NEEDED
Status: DISCONTINUED | OUTPATIENT
Start: 2021-05-29 | End: 2021-06-01 | Stop reason: HOSPADM

## 2021-05-29 RX ORDER — PROMETHAZINE HYDROCHLORIDE 12.5 MG/1
12.5 SUPPOSITORY RECTAL EVERY 6 HOURS PRN
Status: DISCONTINUED | OUTPATIENT
Start: 2021-05-29 | End: 2021-06-01 | Stop reason: HOSPADM

## 2021-05-29 RX ORDER — ACETAMINOPHEN 325 MG/1
650 TABLET ORAL EVERY 4 HOURS PRN
Status: DISCONTINUED | OUTPATIENT
Start: 2021-05-29 | End: 2021-05-29

## 2021-05-29 RX ADMIN — FENTANYL CITRATE: 0.05 INJECTION, SOLUTION INTRAMUSCULAR; INTRAVENOUS at 02:52

## 2021-05-29 RX ADMIN — FAMOTIDINE 20 MG: 10 INJECTION INTRAVENOUS at 03:20

## 2021-05-29 RX ADMIN — ONDANSETRON 4 MG: 2 INJECTION INTRAMUSCULAR; INTRAVENOUS at 03:27

## 2021-05-29 RX ADMIN — OXYTOCIN 125 ML/HR: 10 INJECTION, SOLUTION INTRAMUSCULAR; INTRAVENOUS at 05:30

## 2021-05-29 RX ADMIN — LIDOCAINE HYDROCHLORIDE,EPINEPHRINE BITARTRATE 20 ML: 20; .005 INJECTION, SOLUTION EPIDURAL; INFILTRATION; INTRACAUDAL; PERINEURAL at 03:28

## 2021-05-29 RX ADMIN — SODIUM CHLORIDE, POTASSIUM CHLORIDE, SODIUM LACTATE AND CALCIUM CHLORIDE: 600; 310; 30; 20 INJECTION, SOLUTION INTRAVENOUS at 03:55

## 2021-05-29 RX ADMIN — OXYCODONE AND ACETAMINOPHEN 1 TABLET: 5; 325 TABLET ORAL at 21:02

## 2021-05-29 RX ADMIN — PHENYLEPHRINE HYDROCHLORIDE 100 MCG: 10 INJECTION INTRAVENOUS at 03:59

## 2021-05-29 RX ADMIN — KETOROLAC TROMETHAMINE 30 MG: 30 INJECTION, SOLUTION INTRAMUSCULAR at 07:02

## 2021-05-29 RX ADMIN — KETOROLAC TROMETHAMINE 30 MG: 30 INJECTION, SOLUTION INTRAMUSCULAR at 13:32

## 2021-05-29 RX ADMIN — PHENYLEPHRINE HYDROCHLORIDE 100 MCG: 10 INJECTION INTRAVENOUS at 04:11

## 2021-05-29 RX ADMIN — BUPIVACAINE HYDROCHLORIDE 10 ML: 5 INJECTION, SOLUTION EPIDURAL; INTRACAUDAL; PERINEURAL at 03:20

## 2021-05-29 RX ADMIN — KETOROLAC TROMETHAMINE 30 MG: 30 INJECTION, SOLUTION INTRAMUSCULAR at 18:48

## 2021-05-29 RX ADMIN — SODIUM CHLORIDE 500 ML: 9 INJECTION, SOLUTION INTRAVENOUS at 02:59

## 2021-05-29 RX ADMIN — SODIUM CHLORIDE, POTASSIUM CHLORIDE, SODIUM LACTATE AND CALCIUM CHLORIDE 125 ML/HR: 600; 310; 30; 20 INJECTION, SOLUTION INTRAVENOUS at 01:02

## 2021-05-29 RX ADMIN — SODIUM CHLORIDE, POTASSIUM CHLORIDE, SODIUM LACTATE AND CALCIUM CHLORIDE 999 ML/HR: 600; 310; 30; 20 INJECTION, SOLUTION INTRAVENOUS at 03:31

## 2021-05-29 RX ADMIN — CEFAZOLIN SODIUM 3 G: 10 INJECTION, POWDER, FOR SOLUTION INTRAVENOUS at 03:26

## 2021-05-29 RX ADMIN — MORPHINE SULFATE 3 MG: 1 INJECTION, SOLUTION EPIDURAL; INTRATHECAL; INTRAVENOUS at 04:41

## 2021-05-29 RX ADMIN — DOCUSATE SODIUM 100 MG: 100 CAPSULE, LIQUID FILLED ORAL at 20:26

## 2021-05-29 RX ADMIN — ACETAMINOPHEN 1000 MG: 500 TABLET, FILM COATED ORAL at 03:20

## 2021-05-29 RX ADMIN — OXYTOCIN 999 ML/HR: 10 INJECTION INTRAVENOUS at 04:18

## 2021-05-29 RX ADMIN — AZITHROMYCIN 500 MG: 500 INJECTION, POWDER, LYOPHILIZED, FOR SOLUTION INTRAVENOUS at 04:00

## 2021-05-29 NOTE — ANESTHESIA POSTPROCEDURE EVALUATION
"Patient: Radha Shipley    Procedure Summary     Date: 21 Room / Location:  [facility]    Anesthesia Start:  Anesthesia Stop: 21    Procedure:  SECTION PRIMARY (N/A Abdomen) Diagnosis: (FETAL INTOLERANCE TO LABOR)    Surgeons: Melly Snow MD Provider: Sahil Echavarria MD    Anesthesia Type: epidural ASA Status: 3          Anesthesia Type: epidural    Vitals  Vitals Value Taken Time   /68 21 0731   Temp 36.6 °C (97.9 °F) 21 0455   Pulse 75 21 0740   Resp 18 21 07   SpO2 99 % 21   Vitals shown include unvalidated device data.        Post Anesthesia Care and Evaluation    Patient location during evaluation: bedside  Patient participation: complete - patient participated  Level of consciousness: awake and alert  Pain management: adequate  Airway patency: patent  Anesthetic complications: No anesthetic complications  PONV Status: none  Cardiovascular status: acceptable and hemodynamically stable  Respiratory status: acceptable and spontaneous ventilation  Hydration status: acceptable    Comments: /75   Pulse 71   Temp 36.6 °C (97.9 °F) (Oral)   Resp 18   Ht 165.1 cm (65\")   Wt (!) 151 kg (333 lb 12.8 oz)   LMP 2020   SpO2 98%   Breastfeeding Yes   BMI 55.55 kg/m²         "

## 2021-05-30 LAB
GLUCOSE BLDC GLUCOMTR-MCNC: 101 MG/DL (ref 70–130)
GLUCOSE BLDC GLUCOMTR-MCNC: 108 MG/DL (ref 70–130)
GLUCOSE BLDC GLUCOMTR-MCNC: 93 MG/DL (ref 70–130)

## 2021-05-30 PROCEDURE — 82962 GLUCOSE BLOOD TEST: CPT

## 2021-05-30 PROCEDURE — 25010000002 KETOROLAC TROMETHAMINE PER 15 MG: Performed by: OBSTETRICS & GYNECOLOGY

## 2021-05-30 PROCEDURE — 25010000002 ENOXAPARIN PER 10 MG: Performed by: OBSTETRICS & GYNECOLOGY

## 2021-05-30 RX ADMIN — OXYCODONE AND ACETAMINOPHEN 1 TABLET: 5; 325 TABLET ORAL at 18:56

## 2021-05-30 RX ADMIN — KETOROLAC TROMETHAMINE 30 MG: 30 INJECTION, SOLUTION INTRAMUSCULAR at 01:32

## 2021-05-30 RX ADMIN — OXYCODONE AND ACETAMINOPHEN 1 TABLET: 5; 325 TABLET ORAL at 14:38

## 2021-05-30 RX ADMIN — OXYCODONE AND ACETAMINOPHEN 1 TABLET: 5; 325 TABLET ORAL at 09:05

## 2021-05-30 RX ADMIN — DOCUSATE SODIUM 100 MG: 100 CAPSULE, LIQUID FILLED ORAL at 20:27

## 2021-05-30 RX ADMIN — IBUPROFEN 800 MG: 800 TABLET, FILM COATED ORAL at 16:30

## 2021-05-30 RX ADMIN — ENOXAPARIN SODIUM 40 MG: 40 INJECTION SUBCUTANEOUS at 09:05

## 2021-05-30 RX ADMIN — DOCUSATE SODIUM 100 MG: 100 CAPSULE, LIQUID FILLED ORAL at 09:05

## 2021-05-30 RX ADMIN — IBUPROFEN 800 MG: 800 TABLET, FILM COATED ORAL at 09:05

## 2021-05-30 RX ADMIN — Medication: at 14:38

## 2021-05-31 LAB
BASOPHILS # BLD AUTO: 0.01 10*3/MM3 (ref 0–0.2)
BASOPHILS NFR BLD AUTO: 0.1 % (ref 0–1.5)
DEPRECATED RDW RBC AUTO: 45.5 FL (ref 37–54)
EOSINOPHIL # BLD AUTO: 0.06 10*3/MM3 (ref 0–0.4)
EOSINOPHIL NFR BLD AUTO: 0.7 % (ref 0.3–6.2)
ERYTHROCYTE [DISTWIDTH] IN BLOOD BY AUTOMATED COUNT: 13.7 % (ref 12.3–15.4)
GLUCOSE BLDC GLUCOMTR-MCNC: 111 MG/DL (ref 70–130)
HCT VFR BLD AUTO: 32.2 % (ref 34–46.6)
HGB BLD-MCNC: 10.8 G/DL (ref 12–15.9)
IMM GRANULOCYTES # BLD AUTO: 0.04 10*3/MM3 (ref 0–0.05)
IMM GRANULOCYTES NFR BLD AUTO: 0.5 % (ref 0–0.5)
LYMPHOCYTES # BLD AUTO: 3.08 10*3/MM3 (ref 0.7–3.1)
LYMPHOCYTES NFR BLD AUTO: 35.6 % (ref 19.6–45.3)
MCH RBC QN AUTO: 30.9 PG (ref 26.6–33)
MCHC RBC AUTO-ENTMCNC: 33.5 G/DL (ref 31.5–35.7)
MCV RBC AUTO: 92 FL (ref 79–97)
MONOCYTES # BLD AUTO: 0.58 10*3/MM3 (ref 0.1–0.9)
MONOCYTES NFR BLD AUTO: 6.7 % (ref 5–12)
NEUTROPHILS NFR BLD AUTO: 4.87 10*3/MM3 (ref 1.7–7)
NEUTROPHILS NFR BLD AUTO: 56.4 % (ref 42.7–76)
NRBC BLD AUTO-RTO: 0 /100 WBC (ref 0–0.2)
PLATELET # BLD AUTO: 219 10*3/MM3 (ref 140–450)
PMV BLD AUTO: 9.4 FL (ref 6–12)
RBC # BLD AUTO: 3.5 10*6/MM3 (ref 3.77–5.28)
WBC # BLD AUTO: 8.64 10*3/MM3 (ref 3.4–10.8)

## 2021-05-31 PROCEDURE — 82962 GLUCOSE BLOOD TEST: CPT

## 2021-05-31 PROCEDURE — 85025 COMPLETE CBC W/AUTO DIFF WBC: CPT | Performed by: OBSTETRICS & GYNECOLOGY

## 2021-05-31 PROCEDURE — 25010000002 ENOXAPARIN PER 10 MG: Performed by: OBSTETRICS & GYNECOLOGY

## 2021-05-31 RX ADMIN — OXYCODONE AND ACETAMINOPHEN 1 TABLET: 5; 325 TABLET ORAL at 18:16

## 2021-05-31 RX ADMIN — ENOXAPARIN SODIUM 40 MG: 40 INJECTION SUBCUTANEOUS at 08:18

## 2021-05-31 RX ADMIN — OXYCODONE AND ACETAMINOPHEN 1 TABLET: 5; 325 TABLET ORAL at 04:47

## 2021-05-31 RX ADMIN — IBUPROFEN 800 MG: 800 TABLET, FILM COATED ORAL at 00:05

## 2021-05-31 RX ADMIN — DOCUSATE SODIUM 100 MG: 100 CAPSULE, LIQUID FILLED ORAL at 21:05

## 2021-05-31 RX ADMIN — DOCUSATE SODIUM 100 MG: 100 CAPSULE, LIQUID FILLED ORAL at 08:18

## 2021-05-31 RX ADMIN — OXYCODONE AND ACETAMINOPHEN 1 TABLET: 5; 325 TABLET ORAL at 12:18

## 2021-05-31 RX ADMIN — OXYCODONE AND ACETAMINOPHEN 1 TABLET: 5; 325 TABLET ORAL at 00:04

## 2021-05-31 RX ADMIN — IBUPROFEN 800 MG: 800 TABLET, FILM COATED ORAL at 12:18

## 2021-06-01 VITALS
TEMPERATURE: 97.4 F | HEART RATE: 72 BPM | SYSTOLIC BLOOD PRESSURE: 105 MMHG | RESPIRATION RATE: 16 BRPM | DIASTOLIC BLOOD PRESSURE: 73 MMHG | WEIGHT: 293 LBS | OXYGEN SATURATION: 99 % | BODY MASS INDEX: 48.82 KG/M2 | HEIGHT: 65 IN

## 2021-06-01 PROCEDURE — 25010000002 ENOXAPARIN PER 10 MG: Performed by: OBSTETRICS & GYNECOLOGY

## 2021-06-01 RX ORDER — IBUPROFEN 800 MG/1
800 TABLET ORAL EVERY 8 HOURS PRN
Qty: 30 TABLET | Refills: 0 | Status: SHIPPED | OUTPATIENT
Start: 2021-06-01

## 2021-06-01 RX ADMIN — IBUPROFEN 800 MG: 800 TABLET, FILM COATED ORAL at 00:28

## 2021-06-01 RX ADMIN — Medication: at 08:36

## 2021-06-01 RX ADMIN — OXYCODONE AND ACETAMINOPHEN 1 TABLET: 5; 325 TABLET ORAL at 00:28

## 2021-06-01 RX ADMIN — OXYCODONE AND ACETAMINOPHEN 1 TABLET: 5; 325 TABLET ORAL at 08:30

## 2021-06-01 RX ADMIN — DOCUSATE SODIUM 100 MG: 100 CAPSULE, LIQUID FILLED ORAL at 08:36

## 2021-06-01 RX ADMIN — IBUPROFEN 800 MG: 800 TABLET, FILM COATED ORAL at 08:30

## 2021-06-01 RX ADMIN — ENOXAPARIN SODIUM 40 MG: 40 INJECTION SUBCUTANEOUS at 08:29

## 2021-06-01 NOTE — PLAN OF CARE
Problem: Adult Inpatient Plan of Care  Goal: Plan of Care Review  Outcome: Met  Flowsheets (Taken 2021 0951)  Progress: improving  Plan of Care Reviewed With: patient  Outcome Summary: Doing well. VSS. Pain well controlled with po meds. Breastfeeding well.  D/C home today.  Goal: Patient-Specific Goal (Individualized)  Outcome: Met  Goal: Absence of Hospital-Acquired Illness or Injury  Outcome: Met  Intervention: Identify and Manage Fall Risk  Recent Flowsheet Documentation  Taken 2021 by Renetta Licona RN  Safety Promotion/Fall Prevention: safety round/check completed  Goal: Optimal Comfort and Wellbeing  Outcome: Met  Intervention: Provide Person-Centered Care  Recent Flowsheet Documentation  Taken 2021 by Renetta Licona RN  Trust Relationship/Rapport: care explained  Goal: Readiness for Transition of Care  Outcome: Met  Intervention: Mutually Develop Transition Plan  Recent Flowsheet Documentation  Taken 2021 0949 by Renetta Licona RN  Equipment Needed After Discharge: none  Equipment Currently Used at Home: none  Anticipated Changes Related to Illness: none  Transportation Anticipated: family or friend will provide  Transportation Concerns: car, none  Concerns to be Addressed: no discharge needs identified  Readmission Within the Last 30 Days: no previous admission in last 30 days  Patient/Family Anticipated Services at Transition: none  Patient/Family Anticipates Transition to: home     Problem:  Fall Injury Risk  Goal: Absence of Fall, Infant Drop and Related Injury  Outcome: Met  Intervention: Promote Injury-Free Environment  Recent Flowsheet Documentation  Taken 2021 by Renetta Licona RN  Safety Promotion/Fall Prevention: safety round/check completed     Problem: Infection (Anesthesia/Analgesia, Neuraxial)  Goal: Absence of Infection Signs and Symptoms  Outcome: Met     Problem: Nausea and Vomiting (Anesthesia/Analgesia, Neuraxial)  Goal: Nausea and  Vomiting Relief  Outcome: Met     Problem: Pain (Anesthesia/Analgesia, Neuraxial)  Goal: Effective Pain Control  Outcome: Met  Intervention: Prevent or Manage Pain  Recent Flowsheet Documentation  Taken 2021 08 by Renetta Licona RN  Pain Management Interventions: see MAR     Problem: Respiratory Compromise (Anesthesia/Analgesia, Neuraxial)  Goal: Effective Oxygenation and Ventilation  Outcome: Met     Problem: Sensorimotor Impairment (Anesthesia/Analgesia, Neuraxial)  Goal: Baseline Motor Function  Outcome: Met  Intervention: Optimize Sensorimotor Function  Recent Flowsheet Documentation  Taken 2021 by Renetta Licona RN  Safety Promotion/Fall Prevention: safety round/check completed     Problem: Urinary Retention (Anesthesia/Analgesia, Neuraxial)  Goal: Effective Urinary Elimination  Outcome: Met     Problem: Diabetes in Pregnancy  Goal: Blood Glucose Level Within Desired Range  Outcome: Met     Problem: Adjustment to Role Transition (Postpartum  Delivery)  Goal: Successful Maternal Role Transition  Outcome: Met     Problem: Bleeding (Postpartum  Delivery)  Goal: Hemostasis  Outcome: Met     Problem: Infection (Postpartum  Delivery)  Goal: Absence of Infection Signs and Symptoms  Outcome: Met     Problem: Pain (Postpartum  Delivery)  Goal: Acceptable Pain Control  Outcome: Met  Intervention: Prevent or Manage Pain  Recent Flowsheet Documentation  Taken 2021 08 by Renetta Licona, RN  Pain Management Interventions: see MAR     Problem: Postoperative Nausea and Vomiting (Postpartum  Delivery)  Goal: Nausea and Vomiting Relief  Outcome: Met     Problem: Postoperative Urinary Retention (Postpartum  Delivery)  Goal: Effective Urinary Elimination  Outcome: Met     Problem: Breastfeeding  Goal: Effective Breastfeeding  Outcome: Met     Problem: Skin Injury Risk Increased  Goal: Skin Health and Integrity  Outcome: Met   Goal Outcome  Evaluation:  Plan of Care Reviewed With: patient  Progress: improving  Outcome Summary: Doing well. VSS. Pain well controlled with po meds. Breastfeeding well.  D/C home today.

## 2021-06-01 NOTE — LACTATION NOTE
This note was copied from a baby's chart.  Mom reports baby is latching better now, she is also supplementing with formula and she knows to pump if baby is sleepy or not nursing well. Discussed [facility], gave card.

## 2021-06-01 NOTE — DISCHARGE SUMMARY
Date of Discharge:  2021    Discharge Diagnosis: s/p     Presenting Problem/History of Present Illness  Pregnancy [Z34.90]  Pregnancy [Z34.90]       Hospital Course  Patient is a 30 y.o. female presented with  due to fetal intolerance of labor. Delivered by Dr Snow. Delivered a viable male infant. Has had no post op complications..      Procedures Performed  Procedure(s):   SECTION PRIMARY         Condition on Discharge:  Post-Op Day 3 S/P   Subjective     Patient reports:    Doing well - ready for discharge. Pain well controlled. Tolerating po and   having flatus. Voiding and ambulating without difficulty. Lochia normal.     Vital Signs  Temp:  [97.4 °F (36.3 °C)-98.1 °F (36.7 °C)] 97.4 °F (36.3 °C)  Heart Rate:  [72-81] 72  Resp:  [16] 16  BP: ()/(65-84) 105/73    Physical Exam    Gen Alert and awake   Abdomen Soft, ND,  Fundus firm with minimal tenderness   Incision  Intact, without erythema or exudate   Extremities Calves NT bilaterally     Assessment/Plan ]   Assessment:  POD 3  -  Doing well. Stable for discharge.     Plan:    Instructions reviewed       Consults:   Consults     No orders found from 2021 to 2021.          Discharge Disposition  Home or Self Care    Discharge Medications     Discharge Medications      New Medications      Instructions Start Date   ibuprofen 800 MG tablet  Commonly known as: ADVIL,MOTRIN   800 mg, Oral, Every 8 Hours PRN         Continue These Medications      Instructions Start Date   BD Pen Needle Micro U/F 32G X 6 MM misc  Generic drug: Insulin Pen Needle   No dose, route, or frequency recorded.      ONE TOUCH ULTRA 2 w/Device kit   No dose, route, or frequency recorded.      OneTouch Delica Plus [id number] misc   No dose, route, or frequency recorded.      OneTouch Ultra test strip  Generic drug: glucose blood   No dose, route, or frequency recorded.      PRENATAL VITAMINS PO   Oral         Stop These Medications     Levemir FlexTouch 100 UNIT/ML injection  Generic drug: insulin detemir            The patient has been prescribed a controlled substance.  She has been counseled on the risks associated with using the medication.   The addictive potential of this medication and alternatives were discussed carefully with this patient and she demonstrated understanding.  A VANESSA report has been obtained and reviewed.    Activity at Discharge: restrictions reviewed    Follow-up Appointments  No future appointments.  Additional Instructions for the Follow-ups that You Need to Schedule     Notify Physician or Go To The ED For the Following Conditions   As directed      Call if increased bleeding, pain or fever or signs of incisional infection    Order Comments: Call if increased bleeding, pain or fever or signs of incisional infection                Test Results Pending at Discharge       LEANDRO Joe  06/01/21  08:38 EDT

## 2021-06-01 NOTE — PLAN OF CARE
Goal Outcome Evaluation:  Plan of Care Reviewed With: patient, spouse  Progress: improving   Vital signs stable. Pain is controlled with po medication. Voiding without difficulty. Tolerating regular diet.

## 2021-06-01 NOTE — LACTATION NOTE
This note was copied from a baby's chart.  Informed PT that LC is here to help with BF tonight. Upon entering the room baby is BF to the right breast in football position. It looks like Infant is latching deeply to nipple and has a good jaw rotation. PT denies any questions and concerns at this time. Encouraged to call LC if needing further assistance.

## 2023-03-10 LAB
EXTERNAL HEPATITIS B SURFACE ANTIGEN: NEGATIVE
EXTERNAL RUBELLA QUALITATIVE: NORMAL
EXTERNAL SYPHILIS RPR SCREEN: NORMAL
HIV1 P24 AG SERPL QL IA: NORMAL

## 2023-09-22 LAB — EXTERNAL GROUP B STREP ANTIGEN: NEGATIVE

## 2023-10-18 ENCOUNTER — ANESTHESIA EVENT (OUTPATIENT)
Dept: LABOR AND DELIVERY | Facility: HOSPITAL | Age: 33
End: 2023-10-18
Payer: COMMERCIAL

## 2023-10-18 ENCOUNTER — HOSPITAL ENCOUNTER (INPATIENT)
Facility: HOSPITAL | Age: 33
LOS: 3 days | Discharge: HOME | End: 2023-10-21
Attending: OBSTETRICS & GYNECOLOGY | Admitting: OBSTETRICS & GYNECOLOGY
Payer: COMMERCIAL

## 2023-10-18 ENCOUNTER — HOSPITAL ENCOUNTER (INPATIENT)
Dept: LABOR AND DELIVERY | Facility: HOSPITAL | Age: 33
Discharge: HOME | End: 2023-10-18
Payer: COMMERCIAL

## 2023-10-18 ENCOUNTER — ANESTHESIA (OUTPATIENT)
Dept: LABOR AND DELIVERY | Facility: HOSPITAL | Age: 33
End: 2023-10-18
Payer: COMMERCIAL

## 2023-10-18 PROBLEM — Z3A.39 39 WEEKS GESTATION OF PREGNANCY: Status: ACTIVE | Noted: 2023-10-18

## 2023-10-18 LAB
ABO GROUP BLD: NORMAL
ALBUMIN SERPL-MCNC: 3.3 G/DL (ref 3.5–5.2)
ALBUMIN/GLOB SERPL: 0.9 G/DL
ALP SERPL-CCNC: 167 U/L (ref 39–117)
ALT SERPL W P-5'-P-CCNC: 10 U/L (ref 1–33)
ANION GAP SERPL CALCULATED.3IONS-SCNC: 9.2 MMOL/L (ref 5–15)
AST SERPL-CCNC: 15 U/L (ref 1–32)
BASOPHILS # BLD AUTO: 0 10*3/MM3 (ref 0–0.2)
BASOPHILS NFR BLD AUTO: 0 % (ref 0–1.5)
BILIRUB SERPL-MCNC: 0.4 MG/DL (ref 0–1.2)
BLD GP AB SCN SERPL QL: NEGATIVE
BUN SERPL-MCNC: 8 MG/DL (ref 6–20)
BUN/CREAT SERPL: 10.8 (ref 7–25)
CALCIUM SPEC-SCNC: 9.1 MG/DL (ref 8.6–10.5)
CHLORIDE SERPL-SCNC: 105 MMOL/L (ref 98–107)
CO2 SERPL-SCNC: 22.8 MMOL/L (ref 22–29)
CREAT SERPL-MCNC: 0.74 MG/DL (ref 0.57–1)
DEPRECATED RDW RBC AUTO: 43.3 FL (ref 37–54)
EGFRCR SERPLBLD CKD-EPI 2021: 110.4 ML/MIN/1.73
EOSINOPHIL # BLD AUTO: 0.03 10*3/MM3 (ref 0–0.4)
EOSINOPHIL NFR BLD AUTO: 0.4 % (ref 0.3–6.2)
ERYTHROCYTE [DISTWIDTH] IN BLOOD BY AUTOMATED COUNT: 13.2 % (ref 12.3–15.4)
GLOBULIN UR ELPH-MCNC: 3.5 GM/DL
GLUCOSE BLDC GLUCOMTR-MCNC: 102 MG/DL (ref 70–130)
GLUCOSE SERPL-MCNC: 140 MG/DL (ref 65–99)
HCT VFR BLD AUTO: 35.6 % (ref 34–46.6)
HGB BLD-MCNC: 12.2 G/DL (ref 12–15.9)
IMM GRANULOCYTES # BLD AUTO: 0.04 10*3/MM3 (ref 0–0.05)
IMM GRANULOCYTES NFR BLD AUTO: 0.5 % (ref 0–0.5)
LYMPHOCYTES # BLD AUTO: 3.08 10*3/MM3 (ref 0.7–3.1)
LYMPHOCYTES NFR BLD AUTO: 37.4 % (ref 19.6–45.3)
MCH RBC QN AUTO: 31.1 PG (ref 26.6–33)
MCHC RBC AUTO-ENTMCNC: 34.3 G/DL (ref 31.5–35.7)
MCV RBC AUTO: 90.8 FL (ref 79–97)
MONOCYTES # BLD AUTO: 0.52 10*3/MM3 (ref 0.1–0.9)
MONOCYTES NFR BLD AUTO: 6.3 % (ref 5–12)
NEUTROPHILS NFR BLD AUTO: 4.56 10*3/MM3 (ref 1.7–7)
NEUTROPHILS NFR BLD AUTO: 55.4 % (ref 42.7–76)
NRBC BLD AUTO-RTO: 0 /100 WBC (ref 0–0.2)
PLATELET # BLD AUTO: 271 10*3/MM3 (ref 140–450)
PMV BLD AUTO: 10.1 FL (ref 6–12)
POTASSIUM SERPL-SCNC: 3.8 MMOL/L (ref 3.5–5.2)
PROT SERPL-MCNC: 6.8 G/DL (ref 6–8.5)
RBC # BLD AUTO: 3.92 10*6/MM3 (ref 3.77–5.28)
RH BLD: POSITIVE
SODIUM SERPL-SCNC: 137 MMOL/L (ref 136–145)
T&S EXPIRATION DATE: NORMAL
WBC NRBC COR # BLD: 8.23 10*3/MM3 (ref 3.4–10.8)

## 2023-10-18 PROCEDURE — 25810000003 LACTATED RINGERS PER 1000 ML: Performed by: OBSTETRICS & GYNECOLOGY

## 2023-10-18 PROCEDURE — 82948 REAGENT STRIP/BLOOD GLUCOSE: CPT

## 2023-10-18 PROCEDURE — 86901 BLOOD TYPING SEROLOGIC RH(D): CPT | Performed by: OBSTETRICS & GYNECOLOGY

## 2023-10-18 PROCEDURE — 25010000002 DIPHENHYDRAMINE PER 50 MG: Performed by: STUDENT IN AN ORGANIZED HEALTH CARE EDUCATION/TRAINING PROGRAM

## 2023-10-18 PROCEDURE — 86900 BLOOD TYPING SEROLOGIC ABO: CPT | Performed by: OBSTETRICS & GYNECOLOGY

## 2023-10-18 PROCEDURE — C1755 CATHETER, INTRASPINAL: HCPCS | Performed by: ANESTHESIOLOGY

## 2023-10-18 PROCEDURE — 80053 COMPREHEN METABOLIC PANEL: CPT | Performed by: OBSTETRICS & GYNECOLOGY

## 2023-10-18 PROCEDURE — 86850 RBC ANTIBODY SCREEN: CPT | Performed by: OBSTETRICS & GYNECOLOGY

## 2023-10-18 PROCEDURE — 85025 COMPLETE CBC W/AUTO DIFF WBC: CPT | Performed by: OBSTETRICS & GYNECOLOGY

## 2023-10-18 RX ORDER — SODIUM CHLORIDE 9 MG/ML
40 INJECTION, SOLUTION INTRAVENOUS AS NEEDED
Status: DISCONTINUED | OUTPATIENT
Start: 2023-10-18 | End: 2023-10-19

## 2023-10-18 RX ORDER — SODIUM CHLORIDE 0.9 % (FLUSH) 0.9 %
10 SYRINGE (ML) INJECTION EVERY 12 HOURS SCHEDULED
Status: DISCONTINUED | OUTPATIENT
Start: 2023-10-18 | End: 2023-10-19

## 2023-10-18 RX ORDER — FAMOTIDINE 10 MG/ML
20 INJECTION, SOLUTION INTRAVENOUS ONCE AS NEEDED
Status: DISCONTINUED | OUTPATIENT
Start: 2023-10-18 | End: 2023-10-19

## 2023-10-18 RX ORDER — ONDANSETRON 2 MG/ML
4 INJECTION INTRAMUSCULAR; INTRAVENOUS EVERY 6 HOURS PRN
Status: DISCONTINUED | OUTPATIENT
Start: 2023-10-18 | End: 2023-10-19

## 2023-10-18 RX ORDER — TERBUTALINE SULFATE 1 MG/ML
0.25 INJECTION, SOLUTION SUBCUTANEOUS AS NEEDED
Status: DISCONTINUED | OUTPATIENT
Start: 2023-10-18 | End: 2023-10-19

## 2023-10-18 RX ORDER — SODIUM CHLORIDE 0.9 % (FLUSH) 0.9 %
10 SYRINGE (ML) INJECTION AS NEEDED
Status: DISCONTINUED | OUTPATIENT
Start: 2023-10-18 | End: 2023-10-19

## 2023-10-18 RX ORDER — ACETAMINOPHEN 325 MG/1
650 TABLET ORAL EVERY 4 HOURS PRN
Status: DISCONTINUED | OUTPATIENT
Start: 2023-10-18 | End: 2023-10-19

## 2023-10-18 RX ORDER — EPHEDRINE SULFATE 50 MG/ML
5 INJECTION, SOLUTION INTRAVENOUS
Status: DISCONTINUED | OUTPATIENT
Start: 2023-10-18 | End: 2023-10-19

## 2023-10-18 RX ORDER — OXYTOCIN/0.9 % SODIUM CHLORIDE 30/500 ML
2-20 PLASTIC BAG, INJECTION (ML) INTRAVENOUS
Status: DISCONTINUED | OUTPATIENT
Start: 2023-10-18 | End: 2023-10-19

## 2023-10-18 RX ORDER — FENTANYL CIT 0.2 MG/100ML-ROPIV 0.2%-NACL 0.9% EPIDURAL INJ 2/0.2 MCG/ML-%
10 SOLUTION INJECTION CONTINUOUS
Status: DISCONTINUED | OUTPATIENT
Start: 2023-10-18 | End: 2023-10-19

## 2023-10-18 RX ORDER — ONDANSETRON 2 MG/ML
4 INJECTION INTRAMUSCULAR; INTRAVENOUS ONCE AS NEEDED
Status: DISCONTINUED | OUTPATIENT
Start: 2023-10-18 | End: 2023-10-19

## 2023-10-18 RX ORDER — MAGNESIUM CARB/ALUMINUM HYDROX 105-160MG
30 TABLET,CHEWABLE ORAL ONCE AS NEEDED
Status: DISCONTINUED | OUTPATIENT
Start: 2023-10-18 | End: 2023-10-19

## 2023-10-18 RX ORDER — MORPHINE SULFATE 2 MG/ML
1 INJECTION, SOLUTION INTRAMUSCULAR; INTRAVENOUS EVERY 4 HOURS PRN
Status: DISCONTINUED | OUTPATIENT
Start: 2023-10-18 | End: 2023-10-19

## 2023-10-18 RX ORDER — FAMOTIDINE 10 MG/ML
20 INJECTION, SOLUTION INTRAVENOUS 2 TIMES DAILY PRN
Status: DISCONTINUED | OUTPATIENT
Start: 2023-10-18 | End: 2023-10-19

## 2023-10-18 RX ORDER — DIPHENHYDRAMINE HYDROCHLORIDE 50 MG/ML
12.5 INJECTION INTRAMUSCULAR; INTRAVENOUS EVERY 8 HOURS PRN
Status: DISCONTINUED | OUTPATIENT
Start: 2023-10-18 | End: 2023-10-19

## 2023-10-18 RX ORDER — LIDOCAINE HYDROCHLORIDE 10 MG/ML
0.5 INJECTION, SOLUTION INFILTRATION; PERINEURAL ONCE AS NEEDED
Status: DISCONTINUED | OUTPATIENT
Start: 2023-10-18 | End: 2023-10-19

## 2023-10-18 RX ORDER — NALOXONE HCL 0.4 MG/ML
0.4 VIAL (ML) INJECTION
Status: DISCONTINUED | OUTPATIENT
Start: 2023-10-18 | End: 2023-10-19

## 2023-10-18 RX ORDER — DIPHENHYDRAMINE HYDROCHLORIDE 50 MG/ML
25 INJECTION INTRAMUSCULAR; INTRAVENOUS NIGHTLY PRN
Status: DISCONTINUED | OUTPATIENT
Start: 2023-10-18 | End: 2023-10-19

## 2023-10-18 RX ORDER — ONDANSETRON 4 MG/1
4 TABLET, FILM COATED ORAL EVERY 6 HOURS PRN
Status: DISCONTINUED | OUTPATIENT
Start: 2023-10-18 | End: 2023-10-19

## 2023-10-18 RX ORDER — SODIUM CHLORIDE, SODIUM LACTATE, POTASSIUM CHLORIDE, CALCIUM CHLORIDE 600; 310; 30; 20 MG/100ML; MG/100ML; MG/100ML; MG/100ML
125 INJECTION, SOLUTION INTRAVENOUS CONTINUOUS
Status: DISCONTINUED | OUTPATIENT
Start: 2023-10-18 | End: 2023-10-19

## 2023-10-18 RX ORDER — FAMOTIDINE 20 MG/1
20 TABLET, FILM COATED ORAL 2 TIMES DAILY PRN
Status: DISCONTINUED | OUTPATIENT
Start: 2023-10-18 | End: 2023-10-19

## 2023-10-18 RX ADMIN — Medication 10 ML/HR: at 23:23

## 2023-10-18 RX ADMIN — Medication 2 MILLI-UNITS/MIN: at 22:15

## 2023-10-18 RX ADMIN — DIPHENHYDRAMINE HYDROCHLORIDE 25 MG: 50 INJECTION, SOLUTION INTRAMUSCULAR; INTRAVENOUS at 22:15

## 2023-10-18 RX ADMIN — SODIUM CHLORIDE, POTASSIUM CHLORIDE, SODIUM LACTATE AND CALCIUM CHLORIDE 999 ML/HR: 600; 310; 30; 20 INJECTION, SOLUTION INTRAVENOUS at 23:39

## 2023-10-18 RX ADMIN — SODIUM CHLORIDE, POTASSIUM CHLORIDE, SODIUM LACTATE AND CALCIUM CHLORIDE 125 ML/HR: 600; 310; 30; 20 INJECTION, SOLUTION INTRAVENOUS at 20:29

## 2023-10-19 LAB
GLUCOSE BLDC GLUCOMTR-MCNC: 102 MG/DL (ref 70–130)
GLUCOSE BLDC GLUCOMTR-MCNC: 108 MG/DL (ref 70–130)
GLUCOSE BLDC GLUCOMTR-MCNC: 128 MG/DL (ref 70–130)

## 2023-10-19 PROCEDURE — 88307 TISSUE EXAM BY PATHOLOGIST: CPT

## 2023-10-19 PROCEDURE — 25810000003 LACTATED RINGERS PER 1000 ML: Performed by: OBSTETRICS & GYNECOLOGY

## 2023-10-19 PROCEDURE — 82948 REAGENT STRIP/BLOOD GLUCOSE: CPT

## 2023-10-19 PROCEDURE — 0KQM0ZZ REPAIR PERINEUM MUSCLE, OPEN APPROACH: ICD-10-PCS | Performed by: OBSTETRICS & GYNECOLOGY

## 2023-10-19 PROCEDURE — 3E0E3GC INTRODUCTION OF OTHER THERAPEUTIC SUBSTANCE INTO PRODUCTS OF CONCEPTION, PERCUTANEOUS APPROACH: ICD-10-PCS | Performed by: STUDENT IN AN ORGANIZED HEALTH CARE EDUCATION/TRAINING PROGRAM

## 2023-10-19 PROCEDURE — 63710000001 INSULIN LISPRO (HUMAN) PER 5 UNITS: Performed by: STUDENT IN AN ORGANIZED HEALTH CARE EDUCATION/TRAINING PROGRAM

## 2023-10-19 PROCEDURE — 25810000003 SODIUM CHLORIDE 0.9 % SOLUTION: Performed by: STUDENT IN AN ORGANIZED HEALTH CARE EDUCATION/TRAINING PROGRAM

## 2023-10-19 RX ORDER — OXYTOCIN/0.9 % SODIUM CHLORIDE 30/500 ML
250 PLASTIC BAG, INJECTION (ML) INTRAVENOUS CONTINUOUS
Status: ACTIVE | OUTPATIENT
Start: 2023-10-19 | End: 2023-10-19

## 2023-10-19 RX ORDER — HYDROCORTISONE 25 MG/G
CREAM TOPICAL AS NEEDED
Status: DISCONTINUED | OUTPATIENT
Start: 2023-10-19 | End: 2023-10-21 | Stop reason: HOSPADM

## 2023-10-19 RX ORDER — DIPHENHYDRAMINE HCL 25 MG
25 CAPSULE ORAL NIGHTLY PRN
Status: DISCONTINUED | OUTPATIENT
Start: 2023-10-19 | End: 2023-10-21 | Stop reason: HOSPADM

## 2023-10-19 RX ORDER — OXYTOCIN/0.9 % SODIUM CHLORIDE 30/500 ML
999 PLASTIC BAG, INJECTION (ML) INTRAVENOUS ONCE
Status: DISCONTINUED | OUTPATIENT
Start: 2023-10-19 | End: 2023-10-19 | Stop reason: HOSPADM

## 2023-10-19 RX ORDER — METHYLERGONOVINE MALEATE 0.2 MG/ML
200 INJECTION INTRAVENOUS ONCE AS NEEDED
Status: DISCONTINUED | OUTPATIENT
Start: 2023-10-19 | End: 2023-10-19

## 2023-10-19 RX ORDER — OXYTOCIN/0.9 % SODIUM CHLORIDE 30/500 ML
125 PLASTIC BAG, INJECTION (ML) INTRAVENOUS CONTINUOUS PRN
Status: DISCONTINUED | OUTPATIENT
Start: 2023-10-19 | End: 2023-10-21 | Stop reason: HOSPADM

## 2023-10-19 RX ORDER — IBUPROFEN 600 MG/1
600 TABLET ORAL EVERY 6 HOURS PRN
Status: DISCONTINUED | OUTPATIENT
Start: 2023-10-19 | End: 2023-10-21 | Stop reason: HOSPADM

## 2023-10-19 RX ORDER — TRANEXAMIC ACID 10 MG/ML
1000 INJECTION, SOLUTION INTRAVENOUS ONCE AS NEEDED
Status: DISCONTINUED | OUTPATIENT
Start: 2023-10-19 | End: 2023-10-19

## 2023-10-19 RX ORDER — SODIUM CHLORIDE 0.9 % (FLUSH) 0.9 %
1-10 SYRINGE (ML) INJECTION AS NEEDED
Status: DISCONTINUED | OUTPATIENT
Start: 2023-10-19 | End: 2023-10-21 | Stop reason: HOSPADM

## 2023-10-19 RX ORDER — SODIUM CHLORIDE 9 MG/ML
50 INJECTION, SOLUTION INTRAVENOUS CONTINUOUS
Status: DISCONTINUED | OUTPATIENT
Start: 2023-10-19 | End: 2023-10-19

## 2023-10-19 RX ORDER — BISACODYL 10 MG
10 SUPPOSITORY, RECTAL RECTAL DAILY PRN
Status: DISCONTINUED | OUTPATIENT
Start: 2023-10-20 | End: 2023-10-21 | Stop reason: HOSPADM

## 2023-10-19 RX ORDER — INSULIN LISPRO 100 [IU]/ML
2 INJECTION, SOLUTION INTRAVENOUS; SUBCUTANEOUS ONCE
Status: COMPLETED | OUTPATIENT
Start: 2023-10-19 | End: 2023-10-19

## 2023-10-19 RX ORDER — DOCUSATE SODIUM 100 MG/1
100 CAPSULE, LIQUID FILLED ORAL 2 TIMES DAILY
Status: DISCONTINUED | OUTPATIENT
Start: 2023-10-19 | End: 2023-10-21 | Stop reason: HOSPADM

## 2023-10-19 RX ORDER — HYDROCODONE BITARTRATE AND ACETAMINOPHEN 5; 325 MG/1; MG/1
1 TABLET ORAL EVERY 4 HOURS PRN
Status: DISCONTINUED | OUTPATIENT
Start: 2023-10-19 | End: 2023-10-21 | Stop reason: HOSPADM

## 2023-10-19 RX ORDER — ONDANSETRON 4 MG/1
4 TABLET, FILM COATED ORAL EVERY 8 HOURS PRN
Status: DISCONTINUED | OUTPATIENT
Start: 2023-10-19 | End: 2023-10-21 | Stop reason: HOSPADM

## 2023-10-19 RX ORDER — PHYTONADIONE 1 MG/.5ML
INJECTION, EMULSION INTRAMUSCULAR; INTRAVENOUS; SUBCUTANEOUS
Status: ACTIVE
Start: 2023-10-19 | End: 2023-10-20

## 2023-10-19 RX ORDER — ACETAMINOPHEN 325 MG/1
650 TABLET ORAL EVERY 6 HOURS PRN
Status: DISCONTINUED | OUTPATIENT
Start: 2023-10-19 | End: 2023-10-21 | Stop reason: HOSPADM

## 2023-10-19 RX ORDER — MISOPROSTOL 200 UG/1
800 TABLET ORAL ONCE AS NEEDED
Status: DISCONTINUED | OUTPATIENT
Start: 2023-10-19 | End: 2023-10-19

## 2023-10-19 RX ORDER — ERYTHROMYCIN 5 MG/G
OINTMENT OPHTHALMIC
Status: ACTIVE
Start: 2023-10-19 | End: 2023-10-20

## 2023-10-19 RX ORDER — CARBOPROST TROMETHAMINE 250 UG/ML
250 INJECTION, SOLUTION INTRAMUSCULAR
Status: DISCONTINUED | OUTPATIENT
Start: 2023-10-19 | End: 2023-10-19

## 2023-10-19 RX ORDER — HYDROCODONE BITARTRATE AND ACETAMINOPHEN 10; 325 MG/1; MG/1
1 TABLET ORAL EVERY 4 HOURS PRN
Status: DISCONTINUED | OUTPATIENT
Start: 2023-10-19 | End: 2023-10-21 | Stop reason: HOSPADM

## 2023-10-19 RX ADMIN — Medication 10 ML/HR: at 06:24

## 2023-10-19 RX ADMIN — Medication: at 20:03

## 2023-10-19 RX ADMIN — Medication 250 ML/HR: at 16:02

## 2023-10-19 RX ADMIN — SODIUM CHLORIDE, POTASSIUM CHLORIDE, SODIUM LACTATE AND CALCIUM CHLORIDE 125 ML/HR: 600; 310; 30; 20 INJECTION, SOLUTION INTRAVENOUS at 03:23

## 2023-10-19 RX ADMIN — HYDROCODONE BITARTRATE AND ACETAMINOPHEN 1 TABLET: 5; 325 TABLET ORAL at 18:22

## 2023-10-19 RX ADMIN — EPHEDRINE SULFATE 5 MG: 50 INJECTION INTRAVENOUS at 04:04

## 2023-10-19 RX ADMIN — DOCUSATE SODIUM 100 MG: 100 CAPSULE, LIQUID FILLED ORAL at 20:03

## 2023-10-19 RX ADMIN — IBUPROFEN 600 MG: 600 TABLET, FILM COATED ORAL at 16:02

## 2023-10-19 RX ADMIN — INSULIN LISPRO 2 UNITS: 100 INJECTION, SOLUTION INTRAVENOUS; SUBCUTANEOUS at 08:02

## 2023-10-19 RX ADMIN — SODIUM CHLORIDE, POTASSIUM CHLORIDE, SODIUM LACTATE AND CALCIUM CHLORIDE 125 ML/HR: 600; 310; 30; 20 INJECTION, SOLUTION INTRAVENOUS at 11:29

## 2023-10-19 RX ADMIN — SODIUM CHLORIDE 250 ML: 9 INJECTION, SOLUTION INTRAVENOUS at 05:50

## 2023-10-19 RX ADMIN — Medication: at 18:22

## 2023-10-19 NOTE — NURSING NOTE
Pt, baby, and visitor transferred to room 319 alert and oriented with all belongings. Call light within reach; MATHEUS Morris at bedside. Relinquishing care at this time.

## 2023-10-19 NOTE — PROGRESS NOTES
Periods of cat II tracing- shallow late decelerations or minimal variability. Resolved with maternal repositioning, IV fluids, d/c pitocin  SVE 5/60/-2 > good response to fetal scalp stim noted  AROM performed with  thin meconium staining > IUPC placed and amnioinfusion initiated    Reviewed with patient possible need for  if fetus does not tolerate continued attempts at labor augmentation given periods of cat II tracing previously. Reviewed with not currently indicated as she is making cervical change and good response to scalp stim and currently cat I tracing. Can restart pitocin if contractions do not increase in freqency s/p AROM and FHR reassuring    Joy Rodriguez MD   10/19/23

## 2023-10-19 NOTE — PROGRESS NOTES
Assume care of pt/    NST reviewed- Reactive- some periods of minimal variability/ overall reassuring.    CVX- 5-6/70/-2    Reviewed in detail with pt and  that due to h/o CD low thresh hold for suspicious heart tone to proceed with CD/ reviewed CD process including taping of abdomen. All questions answered/     Pit started and if need to stop pit again will proceed with CD/

## 2023-10-19 NOTE — ANESTHESIA PROCEDURE NOTES
Labor Epidural      Patient reassessed immediately prior to procedure    Patient location during procedure: OB  Start Time: 10/18/2023 11:12 PM  Stop Time: 10/18/2023 11:27 PM  Indication:at surgeon's request  Performed By  Anesthesiologist: Eileen Gracia MD  Preanesthetic Checklist  Completed: patient identified, IV checked, site marked, risks and benefits discussed, surgical consent, monitors and equipment checked, pre-op evaluation and timeout performed  Additional Notes  39w5d    Prep:  Pt Position:sitting  Sterile Tech:cap, gloves, mask and sterile barrier  Prep:chlorhexidine gluconate and isopropyl alcohol  Monitoring:blood pressure monitoring, continuous pulse oximetry and EKG  Epidural Block Procedure:  Approach:midline  Guidance:landmark technique  Location:L4-L5  Needle Type:Tuohy  Needle Gauge:17  Loss of Resistance Medium: saline  Loss of Resistance: 8cm  Cath Depth at skin:13 cm  Paresthesia: none  Aspiration:negative  Test Dose:negative  Number of Attempts: 1  Post Assessment:  Dressing:occlusive dressing applied and secured with tape  Pt Tolerance:patient tolerated the procedure well with no apparent complications  Complications:no

## 2023-10-19 NOTE — PLAN OF CARE
Problem: Adult Inpatient Plan of Care  Goal: Plan of Care Review  Outcome: Ongoing, Progressing  Flowsheets (Taken 10/19/2023 1536)  Progress: improving  Plan of Care Reviewed With: patient  Outcome Evaluation: Patient in LDR12 is alert and oriented with VS WNL. Patient resting comfortably following an uncomplicated . Patient breastfeeding at this time. Funuds firm and midline, light bleeding noted. Patient denies any pain at this time. Call light within reach, bed locked and in the lowest position. Plan of care discussed and patient verbalized understanding. Stable for transfer to mother/baby following 2 hour recovery.  Goal: Patient-Specific Goal (Individualized)  Outcome: Ongoing, Progressing  Goal: Absence of Hospital-Acquired Illness or Injury  Outcome: Ongoing, Progressing  Intervention: Identify and Manage Fall Risk  Recent Flowsheet Documentation  Taken 10/19/2023 1530 by Loyda Nguyen RN  Safety Promotion/Fall Prevention: safety round/check completed  Taken 10/19/2023 1130 by Loyda Nguyen RN  Safety Promotion/Fall Prevention: safety round/check completed  Taken 10/19/2023 0730 by Loyda Nguyen RN  Safety Promotion/Fall Prevention: safety round/check completed  Intervention: Prevent and Manage VTE (Venous Thromboembolism) Risk  Recent Flowsheet Documentation  Taken 10/19/2023 0730 by Loyda Nguyen RN  VTE Prevention/Management:   bilateral   sequential compression devices on  Goal: Optimal Comfort and Wellbeing  Outcome: Ongoing, Progressing  Intervention: Provide Person-Centered Care  Recent Flowsheet Documentation  Taken 10/19/2023 0730 by Loyda Nguyen RN  Trust Relationship/Rapport:   care explained   choices provided   emotional support provided   empathic listening provided   questions answered   questions encouraged   reassurance provided   thoughts/feelings acknowledged  Goal: Readiness for Transition of Care  Outcome: Ongoing, Progressing     Problem: Pain  Acute  Goal: Acceptable Pain Control and Functional Ability  Outcome: Ongoing, Progressing  Intervention: Optimize Psychosocial Wellbeing  Recent Flowsheet Documentation  Taken 10/19/2023 0730 by Loyda Nguyen RN  Diversional Activities:   television   smartphone     Problem:  Fall Injury Risk  Goal: Absence of Fall, Infant Drop and Related Injury  Outcome: Ongoing, Progressing  Intervention: Promote Injury-Free Environment  Recent Flowsheet Documentation  Taken 10/19/2023 1530 by Loyda Nguyen RN  Safety Promotion/Fall Prevention: safety round/check completed  Taken 10/19/2023 1130 by Loyda Nguyen RN  Safety Promotion/Fall Prevention: safety round/check completed  Taken 10/19/2023 0730 by Loyda Nguyen RN  Safety Promotion/Fall Prevention: safety round/check completed   Goal Outcome Evaluation:  Plan of Care Reviewed With: patient        Progress: improving  Outcome Evaluation: Patient in LDR12 is alert and oriented with VS WNL. Patient resting comfortably following an uncomplicated . Patient breastfeeding at this time. Funuds firm and midline, light bleeding noted. Patient denies any pain at this time. Call light within reach, bed locked and in the lowest position. Plan of care discussed and patient verbalized understanding. Stable for transfer to mother/baby following 2 hour recovery.

## 2023-10-19 NOTE — L&D DELIVERY NOTE
[facility]  Vaginal Delivery Note    Radha Shipley33 y.o.  at 39w6d    Induction: AROM;Oxytocin;Balloon Dilation   Induction indication: Gestational Diabetes   Cervical ripening: 10/18/2023  9:30 PM   Augmentation:    Labor onset:10/19/2023  5:25 AM   Dilation complete: 10/19/2023  2:50 PM   Beginning of second stage: 10/19/2023  2:55 PM     Antibiotics received during labor: No     Delivery     Delivery: , Spontaneous     YOB: 2022    Time of Birth:  Labor complications:  2:58 PM   Meconium stained amniotic fluid .   Anesthesia: Epidural     Delivering clinician: Kelly Godinez MD   Additional complications: H/O  Section;Insulin Controlled Gestational Diabetes Mellitus (Gdm) In Third Trimester;Uterine Fibroid In Pregnancy     Infant    Findings: Viable female  infant    Infant observations: Weight: 3110 g (6 lb 13.7 oz)    Observations/Comments:  scale 1      Apgars: 9  @ 1 minute /    9  @ 5 minutes                                       Quantitative Blood Loss: Quantitative Blood Loss (mL): 150 mL       Delivery narrative: The patient is a 33 y.o.  at 39w6d.  Membrane rupture/fluid: artificial rupture of membranes  at 5:25 AM  on 10/19/2023  Meconium Present  Progressed normally to complete at 2:50 PM . Labored down until 10/19/2023  2:55 PM . Fetal status reassuring throughout.   of viable  infant.  2:58 PM .  Both shoulders delivery easily. Apgars 9  @ 1 minute /9  @ 5 minutes. Weight: 3110 g (6 lb 13.7 oz) . Spontaneous delivery of an intact placenta with a 3-vessel cord. Cervix and rectum intact. 2nd degree laceration repaired in usual fashion with 2-0 chromic suture. Mother and baby well bonded and recovering well.             Kelly Godinez MD  10/23/23  12:40 EDT

## 2023-10-19 NOTE — H&P
[facility]- Labor & Delivery History and Physical      Patient Name: Radha Shipley  YOB: 1990  MRN: [id number]      Chief Complaint   Patient presents with    Scheduled Induction       Subjective     Patient is a 33 y.o. female  currently at 39w5d, who presents for induction of labor due to GDMA2, maternal obesity. She is doing well- deneis ctx/LOF,VB. Endorses good FM.    Her prenatal care is complicated by:  - History of  x1  - GDMA2  - BMI 54  - Uterine Fibroid (3cm right lateral)    The following portions of the patients history were reviewed and updated as appropriate: current medications, allergies, past medical history, past surgical history, past family history, past social history, and problem list .       Prenatal Information:  External Prenatal Results       Pregnancy Outside Results - Transcribed From Office Records - See Scanned Records For Details       Test Value Date Time    ABO  O  10/18/23 2012    Rh  Positive  10/18/23 2012    Antibody Screen  Negative  10/18/23 2012    Varicella IgG       Rubella ^ Immune  20     Hgb  12.2 g/dL 10/18/23 2012    Hct  35.6 % 10/18/23 2012    Glucose Fasting GTT       Glucose Tolerance Test 1 hour       Glucose Tolerance Test 3 hour       Gonorrhea (discrete)       Chlamydia (discrete)       RPR ^ Non-Reactive  20     VDRL       Syphilis Antibody       HBsAg ^ Negative  20     Herpes Simplex Virus PCR       Herpes Simplex VIrus Culture       HIV ^ Non-Reactive  20     Hep C RNA Quant PCR       Hep C Antibody ^ Negative  20     AFP       Group B Strep ^ Negative  21     GBS Susceptibility to Clindamycin       GBS Susceptibility to Erythromycin       Fetal Fibronectin       Genetic Testing, Maternal Blood                 Drug Screening       Test Value Date Time    Urine Drug Screen       Amphetamine Screen       Barbiturate Screen       Benzodiazepine Screen       Methadone Screen        Phencyclidine Screen       Opiates Screen       THC Screen       Cocaine Screen       Propoxyphene Screen       Buprenorphine Screen       Methamphetamine Screen       Oxycodone Screen       Tricyclic Antidepressants Screen                 Legend    ^: Historical                             Past OB History:     OB History    Para Term  AB Living   2 1 1 0 0 1   SAB IAB Ectopic Molar Multiple Live Births   0 0 0 0 0 1      # Outcome Date GA Lbr Shahid/2nd Weight Sex Delivery Anes PTL Lv   2 Current            1 Term 21 38w5d  2740 g (6 lb 0.7 oz) M CS-LTranv EPI N SUSY      Birth Comments: or 1 panda      Complications: Fetal Intolerance      Name: CHRISTINA EVANS      Apgar1: 8  Apgar5: 9       Past Medical History: Past Medical History:   Diagnosis Date    Gestational diabetes     insulin controlled      Past Surgical History Past Surgical History:   Procedure Laterality Date     SECTION N/A 2021    Procedure:  SECTION PRIMARY;  Surgeon: Melly Snow MD;  Location: [facility] LABOR DELIVERY;  Service: Obstetrics/Gynecology;  Laterality: N/A;    WISDOM TOOTH EXTRACTION        Family History: History reviewed. No pertinent family history.   Social History:  reports that she has never smoked. She has never used smokeless tobacco.   reports no history of alcohol use.   reports no history of drug use.        General ROS:   Review of Systems   Constitutional:  Negative for chills and fever.   Respiratory:  Negative for cough and shortness of breath.    Cardiovascular:  Negative for chest pain and palpitations.   Gastrointestinal:  Negative for abdominal pain, constipation, diarrhea, nausea and vomiting.   Genitourinary:  Negative for dysuria.        Objective       Vital Signs Range for the last 24 hours  Temperature: Temp:  [98.4 °F (36.9 °C)] 98.4 °F (36.9 °C)   Temp Source: Temp src: Oral   BP: BP: (118-129)/(71-76) 129/72   Pulse: Heart Rate:  [77-92] 79   Respirations:  Resp:  [18] 18   SPO2:     O2 Amount (l/min):     O2 Devices     Weight: Weight:  [147 kg (325 lb)] 147 kg (325 lb)       Labs on Admission:  Results from last 7 days   Lab Units 10/18/23  2012   WBC 10*3/mm3 8.23   HEMOGLOBIN g/dL 12.2   HEMATOCRIT % 35.6   PLATELETS 10*3/mm3 271           Physical Examination:   Physical Exam  Vitals reviewed.   Constitutional:       General: She is not in acute distress.     Appearance: Normal appearance.   HENT:      Head: Normocephalic and atraumatic.   Eyes:      Extraocular Movements: Extraocular movements intact.      Pupils: Pupils are equal, round, and reactive to light.   Cardiovascular:      Rate and Rhythm: Normal rate and regular rhythm.      Pulses: Normal pulses.      Heart sounds: Normal heart sounds.   Pulmonary:      Effort: Pulmonary effort is normal.      Breath sounds: Normal breath sounds.   Abdominal:      Tenderness: There is no abdominal tenderness. There is no guarding.      Comments: Gravid; Fundal height appropriate for GA   Skin:     General: Skin is warm and dry.   Neurological:      General: No focal deficit present.      Mental Status: She is alert and oriented to person, place, and time.   Psychiatric:         Mood and Affect: Mood normal.          Presentation: Cephalic- confirmed on BSUS   Cervix: Exam by: Method: sterile exam per RN   Dilation: Cervical Dilation (cm): 0-1   Effacement: Cervical Effacement: 40-50%   Station:         Laboratory Results: Rh positive, GBS negative, Rubella immune  Radiology Review: US - EFW 2862gm (51%ile, AC 66%ile)      Assessment & Plan       39 weeks gestation of pregnancy      34yo  at 39+5 presenting for IOL due to GDMA2, maternal BMI 54 in setting of prior  x1    History of  x1  - has been extensively counseled re: risks of TOLAC, office  consents signed 9/15  - reviewed with pt recommendation for epidural (was considering unmedicated) in case of need for emergent      GDMA2  - Had been on Lantus 24u QHS  - Admission glucose: 140 > pt had eaten dinner just prior to arrival  - Plan Accucheck q1h in latent labor, q1h active labor  - Sliding scale insulin PRN     BMI 54  - Shoulder precautions  - Postpartum Lovenox ppx if RCS    ------------------------------------------  LABOR COURSE    SVE ft/25/-3   Cat 1 FHR tracing  CRB placed with 80cc/80cc in uterine/vaginal balloons respectively at approx 2130. Low dose pitocin ordered with balloon. Will reasses at 6hours.      Joy Rodriguez MD  [facility]  10/18/2023  23:33 EDT

## 2023-10-19 NOTE — ANESTHESIA PREPROCEDURE EVALUATION
" Anesthesia Evaluation     Patient summary reviewed and Nursing notes reviewed   no history of anesthetic complications:                Airway   Mallampati: II  Large neck circumference  Dental      Pulmonary - negative pulmonary ROS   Cardiovascular   Exercise tolerance: poor (<4 METS)        Neuro/Psych- negative ROS  GI/Hepatic/Renal/Endo    (+) morbid obesity, diabetes mellitus    Musculoskeletal (-) negative ROS    Abdominal   (+) obese   Substance History - negative use     OB/GYN    (+) Pregnant        Other                    Anesthesia Plan    ASA 3     epidural     (  39w5d    /72   Pulse 79   Temp 36.9 °C (98.4 °F) (Oral)   Resp 18   Ht 165.1 cm (65\")   Wt (!) 147 kg (325 lb)   LMP 2023   BMI 54.08 kg/m²     I have reviewed the patient's history with the patient and the chart, including all pertinent laboratory results and imaging. I have explained the risks of anesthesia including but not limited to dental damage, corneal abrasion, nerve injury, MI, stroke, and death.    )    Anesthetic plan, risks, benefits, and alternatives have been provided, discussed and informed consent has been obtained with: patient.    CODE STATUS:    Level Of Support Discussed With: Patient  Code Status (Patient has no pulse and is not breathing): CPR (Attempt to Resuscitate)  Medical Interventions (Patient has pulse or is breathing): Full Support      "

## 2023-10-19 NOTE — ANESTHESIA POSTPROCEDURE EVALUATION
Patient: Radha Shipley    Procedure Summary       Date: 10/18/23 Room / Location:     Anesthesia Start: 2312 Anesthesia Stop: 10/19/23 1458    Procedure: LABOR ANALGESIA Diagnosis:     Scheduled Providers:  Provider: Eilene Gracia MD    Anesthesia Type: epidural ASA Status: 3            Anesthesia Type: epidural    Vitals  Vitals Value Taken Time   /76 10/19/23 1645   Temp 36.9 °C (98.4 °F) 10/19/23 1500   Pulse 99 10/19/23 1645   Resp 17 10/19/23 1600   SpO2 100 % 10/19/23 1454   Vitals shown include unfiled device data.        Post Anesthesia Care and Evaluation      Comments: I and/or one of my partners was immediately available throughout the patient's labor and the immediate post partum period.

## 2023-10-20 PROBLEM — O24.410 DIET CONTROLLED GESTATIONAL DIABETES MELLITUS (GDM), ANTEPARTUM: Status: ACTIVE | Noted: 2023-10-20

## 2023-10-20 LAB
BASOPHILS # BLD AUTO: 0.02 10*3/MM3 (ref 0–0.2)
BASOPHILS NFR BLD AUTO: 0.1 % (ref 0–1.5)
DEPRECATED RDW RBC AUTO: 45 FL (ref 37–54)
EOSINOPHIL # BLD AUTO: 0.05 10*3/MM3 (ref 0–0.4)
EOSINOPHIL NFR BLD AUTO: 0.4 % (ref 0.3–6.2)
ERYTHROCYTE [DISTWIDTH] IN BLOOD BY AUTOMATED COUNT: 13.3 % (ref 12.3–15.4)
HCT VFR BLD AUTO: 32.4 % (ref 34–46.6)
HGB BLD-MCNC: 11.2 G/DL (ref 12–15.9)
IMM GRANULOCYTES # BLD AUTO: 0.07 10*3/MM3 (ref 0–0.05)
IMM GRANULOCYTES NFR BLD AUTO: 0.5 % (ref 0–0.5)
LYMPHOCYTES # BLD AUTO: 3.42 10*3/MM3 (ref 0.7–3.1)
LYMPHOCYTES NFR BLD AUTO: 25.4 % (ref 19.6–45.3)
MCH RBC QN AUTO: 31.8 PG (ref 26.6–33)
MCHC RBC AUTO-ENTMCNC: 34.6 G/DL (ref 31.5–35.7)
MCV RBC AUTO: 92 FL (ref 79–97)
MONOCYTES # BLD AUTO: 0.76 10*3/MM3 (ref 0.1–0.9)
MONOCYTES NFR BLD AUTO: 5.6 % (ref 5–12)
NEUTROPHILS NFR BLD AUTO: 68 % (ref 42.7–76)
NEUTROPHILS NFR BLD AUTO: 9.14 10*3/MM3 (ref 1.7–7)
NRBC BLD AUTO-RTO: 0 /100 WBC (ref 0–0.2)
PLATELET # BLD AUTO: 219 10*3/MM3 (ref 140–450)
PMV BLD AUTO: 10.1 FL (ref 6–12)
RBC # BLD AUTO: 3.52 10*6/MM3 (ref 3.77–5.28)
WBC NRBC COR # BLD: 13.46 10*3/MM3 (ref 3.4–10.8)

## 2023-10-20 PROCEDURE — 85025 COMPLETE CBC W/AUTO DIFF WBC: CPT | Performed by: OBSTETRICS & GYNECOLOGY

## 2023-10-20 RX ADMIN — HYDROCODONE BITARTRATE AND ACETAMINOPHEN 1 TABLET: 5; 325 TABLET ORAL at 02:04

## 2023-10-20 RX ADMIN — IBUPROFEN 600 MG: 600 TABLET, FILM COATED ORAL at 23:03

## 2023-10-20 RX ADMIN — IBUPROFEN 600 MG: 600 TABLET, FILM COATED ORAL at 08:19

## 2023-10-20 RX ADMIN — IBUPROFEN 600 MG: 600 TABLET, FILM COATED ORAL at 17:02

## 2023-10-20 RX ADMIN — DOCUSATE SODIUM 100 MG: 100 CAPSULE, LIQUID FILLED ORAL at 20:56

## 2023-10-20 RX ADMIN — DOCUSATE SODIUM 100 MG: 100 CAPSULE, LIQUID FILLED ORAL at 08:19

## 2023-10-20 NOTE — PLAN OF CARE
Goal Outcome Evaluation:      PP day 1. VSS, up/ambulating, voiding, pain controlled with PO medication. Bleeding WNL. Will continue to monitor.

## 2023-10-20 NOTE — PROGRESS NOTES
[facility]  Vaginal Delivery Progress Note    Patient Name: Radha Shipley  :  1990  MRN:  [id number]      Subjective   Postpartum Day 1: Vaginal Delivery of a female infant.     The patient feels well without complaints.  Her pain is well controlled.  Reports normal lochia.     The patient plans to breastfeed.    Objective     Vital Signs Range for the last 24 hours  Temperature: Temp:  [97.4 °F (36.3 °C)-98.4 °F (36.9 °C)] 98.2 °F (36.8 °C)       BP: BP: ()/(50-88) 129/83   Pulse: Heart Rate:  [] 74   Respirations: Resp:  [16-18] 16                       Physical Exam:  General: Awake and alert  Abdomen: Fundus: firm, non tender, 1 below umbilicus  Extremities:  trace edema, NT     Labs:     Results from last 7 days   Lab Units 10/18/23  2012   WBC 10*3/mm3 8.23   HEMOGLOBIN g/dL 12.2   HEMATOCRIT % 35.6   PLATELETS 10*3/mm3 271       Prenatal labs results reviewed:  Yes   Rubella:  Immune  Rh Status:    RH type   Date Value Ref Range Status   10/18/2023 Positive  Final         Assessment & Plan  : 1. PPD1 S/P  - Doing well, continue usual cares.   2. GDM- Not prior to pregnancy. On nothing now. BS have normalized. Asymptomatic             39 weeks gestation of pregnancy    Diet controlled gestational diabetes mellitus (GDM), antepartum          Tosha Jones PA-C  10/20/2023  08:19 EDT

## 2023-10-20 NOTE — PLAN OF CARE
Goal Outcome Evaluation:            Progressing well, voids without diff, minimal bleeding pain controlled, breastfeeding

## 2023-10-20 NOTE — LACTATION NOTE
This note was copied from a baby's chart.  Rounded on mom at this time. The curtain is closed and mom doesn't want to be bothered at the moment. Informed her from the door, that if she needs any BF help she can call the number on her white board written on it yesterday. She verbalized understanding.

## 2023-10-21 VITALS
TEMPERATURE: 98 F | RESPIRATION RATE: 17 BRPM | HEART RATE: 86 BPM | SYSTOLIC BLOOD PRESSURE: 118 MMHG | OXYGEN SATURATION: 99 % | BODY MASS INDEX: 48.82 KG/M2 | WEIGHT: 293 LBS | HEIGHT: 65 IN | DIASTOLIC BLOOD PRESSURE: 83 MMHG

## 2023-10-21 RX ORDER — IBUPROFEN 600 MG/1
600 TABLET ORAL EVERY 6 HOURS PRN
Qty: 60 TABLET | Refills: 0 | Status: SHIPPED | OUTPATIENT
Start: 2023-10-21

## 2023-10-21 RX ADMIN — DOCUSATE SODIUM 100 MG: 100 CAPSULE, LIQUID FILLED ORAL at 08:17

## 2023-10-21 RX ADMIN — IBUPROFEN 600 MG: 600 TABLET, FILM COATED ORAL at 12:16

## 2023-10-21 RX ADMIN — IBUPROFEN 600 MG: 600 TABLET, FILM COATED ORAL at 05:12

## 2023-10-21 NOTE — LACTATION NOTE
This note was copied from a baby's chart.  P2,T GDM, BF and F. Mom reports first child had upper and lower restrictions but is not have any pain or issues with bf this baby. Nothing significant was noted on oral assessment but encouraged mom to call if she noticed nipple pinching while here and f/u with [facility] if large weight loss in baby, low milk supply or latching issues after d/c. She was concerned so she will have Peds assess in am. Reviewed Postpartum handbook pages 35-45, [facility] information and asked her to call for any needs tonight. LC number is on room board. She has a Spectra and is ordering a hands free pump.

## 2023-10-21 NOTE — PLAN OF CARE
Goal Outcome Evaluation:  Plan of Care Reviewed With: patient        Progress: improving  Outcome Evaluation: VVS, up ad wendy, breastfeeding, pain controlled, plans to go home today.

## 2023-10-21 NOTE — DISCHARGE SUMMARY
Date of Discharge:  10/21/2023    Discharge Diagnosis: s/p     Presenting Problem/History of Present Illness  39 weeks gestation of pregnancy [Z3A.39]       Hospital Course  Patient is a 33 y.o. female presented with labor at 39w6d, desired . Delivered viable female infant by Dr Godinez. Doing well and desires d/c on PPD2.      Procedures Performed         Consults:   Consults       No orders found from 2023 to 10/19/2023.            Condition on Discharge:   Subjective   Postpartum Day 2 Vaginal Delivery.    The patient feels well without complaints.    Vital Signs  Temp:  [97.8 °F (36.6 °C)-98.1 °F (36.7 °C)] 98 °F (36.7 °C)  Heart Rate:  [81-95] 86  Resp:  [16-17] 17  BP: (113-120)/(73-83) 118/83    Physical Exam:   General: Awake and alert   Abdomen: Fundus: firm, non tender    Extremities:  Calves NT bilaterally    Assessment & Plan     PPD2  S/P  -   Stable for discharge. Instructions reviewed      Discharge Disposition  Home or Self Care    Discharge Medications     Discharge Medications        Changes to Medications        Instructions Start Date   ibuprofen 600 MG tablet  Commonly known as: ADVIL,MOTRIN  What changed:   medication strength  how much to take  when to take this  reasons to take this   600 mg, Oral, Every 6 Hours PRN             Continue These Medications        Instructions Start Date   BD Pen Needle Micro U/F 32G X 6 MM misc  Generic drug: Insulin Pen Needle   No dose, route, or frequency recorded.      ONE TOUCH ULTRA 2 w/Device kit   No dose, route, or frequency recorded.      OneTouch Delica Plus [id number] misc   No dose, route, or frequency recorded.      PRENATAL VITAMINS PO   Oral             Stop These Medications      OneTouch Ultra test strip  Generic drug: glucose blood                The patient has been prescribed a controlled substance.  She has been counseled on the risks associated with using the medication.   The addictive potential of this medication and  alternatives were discussed carefully with this patient and she demonstrated understanding.  A VANESSA report has been obtained and reviewed.       Activity at Discharge: restrictions reviewed    Follow-up Appointments  No future appointments.  Additional Instructions for the Follow-ups that You Need to Schedule       Call MD With Problems / Concerns   As directed      Instructions: call if increased bleeding> 1pad/hr, pain or fever >100.4    Order Comments: Instructions: call if increased bleeding> 1pad/hr, pain or fever >100.4         [facility] Follow[facility]; 2 Weeks   As directed      To: [facility]   Follow Up: 2 Weeks   Follow Up Details: telehealth 2 weeks, in person 6 weeks                Test Results Pending at Discharge       LEANDRO Joe  10/21/23  08:42 EDT

## (undated) DEVICE — ANTIBACTERIAL UNDYED BRAIDED (POLYGLACTIN 910), SYNTHETIC ABSORBABLE SUTURE: Brand: COATED VICRYL

## (undated) DEVICE — SUT VIC PLS 0 CTX 36IN UD VCP978H

## (undated) DEVICE — KT ART BLD GAS QUICK DRAW

## (undated) DEVICE — TRAXI PANNICULUS RETRACTOR WITH RETENTUS TECHNOLOGY (BMI 30-50): Brand: TRAXI® PANNICULUS RETRACTOR

## (undated) DEVICE — 3M(TM) TEGADERM(TM) TRANSPARENT FILM DRESSING FRAME STYLE 1627: Brand: 3M™ TEGADERM™

## (undated) DEVICE — STRIP SKIN CLOSEURE PROXI 1X5IN

## (undated) DEVICE — SOL IRR H2O BTL 1000ML STRL

## (undated) DEVICE — GLV SURG BIOGEL LTX PF 6 1/2

## (undated) DEVICE — SLV SCD KN ADJ EXPRSS LG

## (undated) DEVICE — NDL BLNT 18G 1 1/2IN